# Patient Record
Sex: FEMALE | Race: WHITE | NOT HISPANIC OR LATINO | Employment: FULL TIME | URBAN - METROPOLITAN AREA
[De-identification: names, ages, dates, MRNs, and addresses within clinical notes are randomized per-mention and may not be internally consistent; named-entity substitution may affect disease eponyms.]

---

## 2017-10-31 ENCOUNTER — TRANSFERRED RECORDS (OUTPATIENT)
Dept: MULTI SPECIALTY CLINIC | Facility: CLINIC | Age: 52
End: 2017-10-31

## 2019-03-18 ENCOUNTER — TRANSFERRED RECORDS (OUTPATIENT)
Dept: MULTI SPECIALTY CLINIC | Facility: CLINIC | Age: 54
End: 2019-03-18

## 2020-03-24 ENCOUNTER — TRANSFERRED RECORDS (OUTPATIENT)
Dept: HEALTH INFORMATION MANAGEMENT | Facility: CLINIC | Age: 55
End: 2020-03-24

## 2020-10-29 ENCOUNTER — VIRTUAL VISIT (OUTPATIENT)
Dept: URGENT CARE | Facility: CLINIC | Age: 55
End: 2020-10-29
Payer: COMMERCIAL

## 2020-10-29 DIAGNOSIS — Z20.828 EXPOSURE TO SARS-ASSOCIATED CORONAVIRUS: Primary | ICD-10-CM

## 2020-10-29 PROCEDURE — 99203 OFFICE O/P NEW LOW 30 MIN: CPT | Mod: TEL | Performed by: NURSE PRACTITIONER

## 2020-11-12 ENCOUNTER — APPOINTMENT (OUTPATIENT)
Dept: OCCUPATIONAL MEDICINE | Facility: CLINIC | Age: 55
End: 2020-11-12

## 2020-11-12 PROCEDURE — 99000 SPECIMEN HANDLING OFFICE-LAB: CPT | Performed by: FAMILY MEDICINE

## 2021-03-16 NOTE — PROGRESS NOTES
Assessment & Plan     Multiple joint pain  Patient with multiple complaints of arthritic pain in knees hands shoulders and neck.  Exam is unremarkable today.  She has never been tested for rheumatoid arthritis will obtain testing today.  We will treat the pain conservatively with Voltaren gel, Tylenol, and also's Celebrex.  - Rheumatoid factor  - Cyclic Citrullinated Peptide Antibody IgG  - diclofenac (VOLTAREN) 1 % topical gel  Dispense: 350 g; Refill: 1    Bilateral carpal tunnel syndrome  History of bilateral carpal tunnel surgery in the past.  Do not have these records at this time.  Requested an authorization sent to obtain these today.    History of right knee surgery  Patient with a history of right knee injury at work.  She has had prior surgeries on the right knee.  Currently following with orthopedics for this complaint.  She was recently given Celebrex to utilize for the pain.    Outside records needed  We will have patient sign authorization to receive outside records from medical facilities in [place].  Once these are available will update chart.    Mat Yee DO  [facility]    Piter Nelson is a 56 year old who presents for the following health issues     HPI     Musculoskeletal problem/pain  Onset/Duration: ongoing years/  Description  Location: hips, elbows, ankles, knees, feet, hands   Joint Swelling: YES- in  Her hands. Hx of carpal tunnel surgery   Redness: no  Pain: YES- shoulders neck.   Warmth: no  Intensity:  Moderate.  Progression of Symptoms:  worsening  Accompanying signs and symptoms:   Fevers: no  Numbness/tingling/weakness: YES- tingling in the right toes sometimes   History  Trauma to the area: YES- work comp injury (fell at work) with the right knee. Surgery arthroscopic due to bone on bone - last year was her last surgery for a torn meniscus and arthritis.   Recent illness:  no  Previous similar problem: YES  Previous evaluation:  YES - in  "[place]. Used to be called [facility] in [place] - unable to find this facility online and phone number is no longer in service.   Precipitating or alleviating factors:  Aggravating factors include: sitting, standing, walking, climbing stairs, lifting, exercise, overuse and cold or damp weather  Therapies tried and outcome: tylenol for arthritis, Aleve for arthritis     Arthritis has been worsening over the last year.   Quite stiff in the morning, takes 30 seconds until she can stretch out.      Has history of bilateral carpal tunnel surgery.     Has been taking Tylenol and Aleve for arthritis     Does not believe she has ever been evaluated for rheumatoid arthritis.     Currently follow with Orthopedics for left knee pain and evaluation. She was prescribed Celebrex for pain and discomfort. Hasn't started taking the medication yet at this time.     Patient was receiving her cares from facilities in [place] prior to moving back to [place]. Unfortunately, do not have these records for today. Will have patient sign prior authorization to obtain these records to update medical chart.       Review of Systems   Constitutional, HEENT, cardiovascular, pulmonary, gi and gu systems are negative, except as otherwise noted.      Objective    /82 (BP Location: Left arm, Patient Position: Sitting, Cuff Size: Adult Large)   Pulse 64   Temp 98.8  F (37.1  C) (Tympanic)   Resp 14   Ht 1.651 m (5' 5\")   Wt 89.2 kg (196 lb 9.6 oz)   SpO2 96%   BMI 32.72 kg/m    Body mass index is 32.72 kg/m .  Physical Exam   General: alert, cooperative, no acute distress   CV: RRR, no murmur  Resp: non-labored breathing, clear to auscultation, no wheezing or rales   Abdomen: Soft, non-tender, no guarding.   Extremities: No peripheral edema, calves non-tender.   MSK: Joints without swelling or erythema. Good range of motion in the hands, wrists, neck, knees. Good strength in the hands bilaterally. Full range of motion in the neck.   "

## 2021-03-17 ENCOUNTER — OFFICE VISIT (OUTPATIENT)
Dept: FAMILY MEDICINE | Facility: CLINIC | Age: 56
End: 2021-03-17
Payer: COMMERCIAL

## 2021-03-17 VITALS
TEMPERATURE: 98.8 F | WEIGHT: 196.6 LBS | RESPIRATION RATE: 14 BRPM | HEART RATE: 64 BPM | BODY MASS INDEX: 32.76 KG/M2 | HEIGHT: 65 IN | OXYGEN SATURATION: 96 % | SYSTOLIC BLOOD PRESSURE: 114 MMHG | DIASTOLIC BLOOD PRESSURE: 82 MMHG

## 2021-03-17 DIAGNOSIS — Z98.890 HISTORY OF RIGHT KNEE SURGERY: ICD-10-CM

## 2021-03-17 DIAGNOSIS — G56.03 BILATERAL CARPAL TUNNEL SYNDROME: ICD-10-CM

## 2021-03-17 DIAGNOSIS — M25.50 MULTIPLE JOINT PAIN: Primary | ICD-10-CM

## 2021-03-17 PROBLEM — K58.1 IRRITABLE BOWEL SYNDROME WITH CONSTIPATION: Status: ACTIVE | Noted: 2018-02-01

## 2021-03-17 PROCEDURE — 86431 RHEUMATOID FACTOR QUANT: CPT | Performed by: FAMILY MEDICINE

## 2021-03-17 PROCEDURE — 86200 CCP ANTIBODY: CPT | Performed by: FAMILY MEDICINE

## 2021-03-17 PROCEDURE — 99213 OFFICE O/P EST LOW 20 MIN: CPT | Performed by: FAMILY MEDICINE

## 2021-03-17 PROCEDURE — 36415 COLL VENOUS BLD VENIPUNCTURE: CPT | Performed by: FAMILY MEDICINE

## 2021-03-17 RX ORDER — ACETAMINOPHEN 500 MG
500-1000 TABLET ORAL EVERY 6 HOURS PRN
COMMUNITY

## 2021-03-17 RX ORDER — CELECOXIB 200 MG/1
200 CAPSULE ORAL DAILY PRN
COMMUNITY
Start: 2020-08-01 | End: 2021-11-23

## 2021-03-17 ASSESSMENT — ANXIETY QUESTIONNAIRES
2. NOT BEING ABLE TO STOP OR CONTROL WORRYING: NOT AT ALL
IF YOU CHECKED OFF ANY PROBLEMS ON THIS QUESTIONNAIRE, HOW DIFFICULT HAVE THESE PROBLEMS MADE IT FOR YOU TO DO YOUR WORK, TAKE CARE OF THINGS AT HOME, OR GET ALONG WITH OTHER PEOPLE: SOMEWHAT DIFFICULT
6. BECOMING EASILY ANNOYED OR IRRITABLE: SEVERAL DAYS
1. FEELING NERVOUS, ANXIOUS, OR ON EDGE: SEVERAL DAYS
7. FEELING AFRAID AS IF SOMETHING AWFUL MIGHT HAPPEN: SEVERAL DAYS
5. BEING SO RESTLESS THAT IT IS HARD TO SIT STILL: NOT AT ALL
3. WORRYING TOO MUCH ABOUT DIFFERENT THINGS: SEVERAL DAYS
GAD7 TOTAL SCORE: 4

## 2021-03-17 ASSESSMENT — MIFFLIN-ST. JEOR: SCORE: 1492.65

## 2021-03-17 ASSESSMENT — PATIENT HEALTH QUESTIONNAIRE - PHQ9
5. POOR APPETITE OR OVEREATING: NOT AT ALL
SUM OF ALL RESPONSES TO PHQ QUESTIONS 1-9: 9

## 2021-03-17 NOTE — PATIENT INSTRUCTIONS
Check for rheumatoid arthritis today with lab draw    Can use Celebrex for arthritis pain, can also use Tylenol.     Trial of Voltaren gel as well for pain     Once obtain outside records will update chart.

## 2021-03-18 LAB
CCP AB SER IA-ACNC: 1 U/ML
RHEUMATOID FACT SER NEPH-ACNC: <7 IU/ML (ref 0–20)

## 2021-03-18 ASSESSMENT — ANXIETY QUESTIONNAIRES: GAD7 TOTAL SCORE: 4

## 2021-04-03 ENCOUNTER — HEALTH MAINTENANCE LETTER (OUTPATIENT)
Age: 56
End: 2021-04-03

## 2021-05-10 ENCOUNTER — TELEPHONE (OUTPATIENT)
Dept: FAMILY MEDICINE | Facility: CLINIC | Age: 56
End: 2021-05-10

## 2021-05-10 NOTE — TELEPHONE ENCOUNTER
Patient Quality Outreach      Summary:    Patient has the following on her problem list/HM:   Depression / Dysthymia review    6 Month Remission: 4-8 month window range: no  12 Month Remission: 10-14 month window range: no       PHQ-9 SCORE 3/17/2021   PHQ-9 Total Score 9       If PHQ-9 recheck is 5 or more, route to provider for next steps.    Patient is due/failing the following:   FIT, Colonoscopy and Cologuard, Breast Cancer Screening - Mammogram and Cervical Cancer Screening - PAP Needed    Type of outreach:    Sent [employer] message.    Questions for provider review:    None                                                                                                                                     Autumn Mccartney MA

## 2021-05-11 ENCOUNTER — MYC MEDICAL ADVICE (OUTPATIENT)
Dept: FAMILY MEDICINE | Facility: CLINIC | Age: 56
End: 2021-05-11
Payer: COMMERCIAL

## 2021-05-18 ENCOUNTER — ANCILLARY PROCEDURE (OUTPATIENT)
Dept: GENERAL RADIOLOGY | Facility: CLINIC | Age: 56
End: 2021-05-18
Attending: FAMILY MEDICINE
Payer: COMMERCIAL

## 2021-05-18 ENCOUNTER — OFFICE VISIT (OUTPATIENT)
Dept: FAMILY MEDICINE | Facility: CLINIC | Age: 56
End: 2021-05-18
Payer: COMMERCIAL

## 2021-05-18 VITALS
HEIGHT: 65 IN | TEMPERATURE: 97.9 F | HEART RATE: 65 BPM | SYSTOLIC BLOOD PRESSURE: 124 MMHG | WEIGHT: 197 LBS | DIASTOLIC BLOOD PRESSURE: 70 MMHG | BODY MASS INDEX: 32.82 KG/M2 | RESPIRATION RATE: 16 BRPM | OXYGEN SATURATION: 98 %

## 2021-05-18 DIAGNOSIS — M25.511 ACUTE PAIN OF RIGHT SHOULDER: ICD-10-CM

## 2021-05-18 DIAGNOSIS — M75.41 IMPINGEMENT SYNDROME, SHOULDER, RIGHT: ICD-10-CM

## 2021-05-18 DIAGNOSIS — M75.41 IMPINGEMENT SYNDROME, SHOULDER, RIGHT: Primary | ICD-10-CM

## 2021-05-18 DIAGNOSIS — M79.671 RIGHT FOOT PAIN: ICD-10-CM

## 2021-05-18 PROCEDURE — 99214 OFFICE O/P EST MOD 30 MIN: CPT | Performed by: FAMILY MEDICINE

## 2021-05-18 PROCEDURE — 73030 X-RAY EXAM OF SHOULDER: CPT | Mod: RT | Performed by: RADIOLOGY

## 2021-05-18 RX ORDER — IBUPROFEN 800 MG/1
800 TABLET, FILM COATED ORAL EVERY 8 HOURS PRN
COMMUNITY
End: 2021-05-18

## 2021-05-18 ASSESSMENT — MIFFLIN-ST. JEOR: SCORE: 1494.47

## 2021-05-18 ASSESSMENT — PAIN SCALES - GENERAL: PAINLEVEL: MILD PAIN (3)

## 2021-05-18 NOTE — PROGRESS NOTES
Assessment & Plan     Impingement syndrome, shoulder, right  Acute pain of right shoulder  Discussed symptoms of right shoulder likely related to impingement syndrome.  Discussed conservative cares.  Will obtain x-ray for further evaluation and also will place a physical therapy referral.  If symptoms persist or do not improve with physical therapy will consider a steroid injection or referral to sports medicine.  Patient agrees with the plan.  All questions answered.  - XR Shoulder Right 2 Views  - PHYSICAL THERAPY REFERRAL    Right foot pain  No new trauma, and no signs of infection or concern at this time.  Discussed conservative cares with wearing wide base shoes, Tylenol, NSAIDs, ice, compression.  If symptoms persist or if they worsen discussed return to clinic for further evaluation and cares.    Follow up as needed.     Mat Yee DO  [facility] 56 year [facility] who presents for the following health issues     HPI   56 year old female presents to clinic to discuss msk concerns.     Right shoulder pain  Onset 2 weeks ago.   Strong achy pain that is constant. On the front aspect.    Improved with lying on that shoulder  Has tried ice, and lidocaine patches with mild improvement  Pain worsened by movement and with trying to get clothes on and off at night.   Has not been red or hot.   No prior surgeries on the shoulders.       Right foot bump  Started 1 week ago.   Hurts when wearing shoes.   Achy pain that is constant but worsens with wearing shoes. Depends on what she is doing as well. Walking on uneven ground makes it worse.   No new shoes since then.   No new activity.   No trauma to the foot.    No prior surgeries on the foot.  Has not tried any therapies yet at this time.       Review of Systems   Constitutional, HEENT, cardiovascular, pulmonary, gi and gu systems are negative, except as otherwise noted.      Objective    /70 (BP Location: Left  "arm, Patient Position: Chair, Cuff Size: Adult Large)   Pulse 65   Temp 97.9  F (36.6  C) (Tympanic)   Resp 16   Ht 1.651 m (5' 5\")   Wt 89.4 kg (197 lb)   LMP  (LMP Unknown)   SpO2 98%   BMI 32.78 kg/m    Body mass index is 32.78 kg/m .  Physical Exam   General: alert, cooperative, no acute distress   MSK shoulder: No swelling, erythema, atrophy or deformity.  Tender palpation of the anterior shoulder otherwise nontender over the sternum, clavicle, AC joint, lateral posterior shoulder, spinous scapula, cervical spine.  Range of motion is full in flexion and abduction although slight pain at horizontal.  Full external and internal rotation.  No pain with resisted external rotation.  Strength 5 out of 5 in upper extremities bilaterally.  Sensation intact.  Distal pulse strong.  Empty can testing positive.  Positive Springer, Neer's.  AC crossover test negative.  CV: RRR, no murmur  Resp: non-labored breathing, clear to auscultation, no wheezing or rales   Abdomen: Soft, non-tender, no guarding.   Extremities: No peripheral edema, calves non-tender.   Right Foot: No swelling, erythema or deformity.  Mild tenderness over the lateral aspect of the foot.  No erythematous streaking up or down the foot.  No skin breakage.  Good range of motion in plantarflexion, dorsiflexion, inversion and eversion.      "

## 2021-05-19 NOTE — RESULT ENCOUNTER NOTE
No fracture seen on x-ray.  No bone spurs appreciated either.  Continue with current plan to proceed with physical therapy.

## 2021-05-20 ENCOUNTER — APPOINTMENT (OUTPATIENT)
Dept: OCCUPATIONAL MEDICINE | Facility: CLINIC | Age: 56
End: 2021-05-20

## 2021-05-20 PROCEDURE — 99000 SPECIMEN HANDLING OFFICE-LAB: CPT | Performed by: FAMILY MEDICINE

## 2021-05-27 ENCOUNTER — RECORDS - HEALTHEAST (OUTPATIENT)
Dept: ADMINISTRATIVE | Facility: CLINIC | Age: 56
End: 2021-05-27

## 2021-05-28 ENCOUNTER — RECORDS - HEALTHEAST (OUTPATIENT)
Dept: ADMINISTRATIVE | Facility: CLINIC | Age: 56
End: 2021-05-28

## 2021-06-07 ENCOUNTER — HOSPITAL ENCOUNTER (OUTPATIENT)
Dept: PHYSICAL THERAPY | Facility: CLINIC | Age: 56
Setting detail: THERAPIES SERIES
End: 2021-06-07
Attending: FAMILY MEDICINE
Payer: COMMERCIAL

## 2021-06-07 DIAGNOSIS — M25.511 ACUTE PAIN OF RIGHT SHOULDER: ICD-10-CM

## 2021-06-07 DIAGNOSIS — M75.41 IMPINGEMENT SYNDROME, SHOULDER, RIGHT: ICD-10-CM

## 2021-06-07 PROCEDURE — 97110 THERAPEUTIC EXERCISES: CPT | Mod: GP | Performed by: PHYSICAL THERAPIST

## 2021-06-07 PROCEDURE — 97162 PT EVAL MOD COMPLEX 30 MIN: CPT | Mod: GP | Performed by: PHYSICAL THERAPIST

## 2021-06-07 PROCEDURE — 97035 APP MDLTY 1+ULTRASOUND EA 15: CPT | Mod: GP | Performed by: PHYSICAL THERAPIST

## 2021-06-07 PROCEDURE — 97140 MANUAL THERAPY 1/> REGIONS: CPT | Mod: GP | Performed by: PHYSICAL THERAPIST

## 2021-06-07 NOTE — PROGRESS NOTES
06/07/21 1600   General Information   Type of Visit Initial OP Ortho PT Evaluation   Start of Care Date 06/07/21   Referring Physician Mat Yee    Patient/Family Goals Statement Better sleep - can't get comfortable, Pain w/ sitting watching TV. Lifting 0 - 90 is more difficult than above 90.    Orders Evaluate and Treat   Date of Order 05/18/21   Certification Required? No  (HP - Auth'd)   Medical Diagnosis Impingement Syndrome R Shoulder    Surgical/Medical history reviewed   (OA throughout body, Arthroscopy knee R 20. )   Precautions/Limitations no known precautions/limitations   Special Instructions Asked to try ice at home vs. heat.    General Information Comments Takes Celebrex for knee, and using some Voltaren cream for shoulder also that was prescribed for knee.    Body Part(s)   Body Part(s) Shoulder   Presentation and Etiology   Pertinent history of current problem (include personal factors and/or comorbidities that impact the POC) Started approximately 5/4/21, No trauma, Sudden onset. More R>L.    Impairments A. Pain;B. Decreased WB tolerance;F. Decreased strength and endurance;M. Locking or catching   Functional Limitations perform activities of daily living;perform required work activities   Symptom Location Post Laterall upper arm and Ant R Shoulder, Catching w/ movement and painful.    How/Where did it occur From insidious onset   Onset date of current episode/exacerbation 05/04/21   Chronicity New   Pain rating (0-10 point scale)   (2-7/10 )   Pain quality A. Sharp;B. Dull;C. Aching   Pain/symptoms are: Worse during the day  (Towards end of evening. )   Pain/symptoms exacerbated by C. Lifting;D. Carrying;G. Certain positions   Pain/symptoms eased by D. Nothing;E. Changing positions;G. Heat   Progression of symptoms since onset: Unchanged   Current / Previous Interventions   Diagnostic Tests: X-ray   X-ray Results Results   X-ray results Possible Dislocation.    Current Level of Function    Current Community Support Family/friend caregiver   Patient role/employment history A. 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    Living environment [place]   Fall Risk Screen   Fall screen completed by PT   Have you fallen 2 or more times in the past year? No   Have you fallen and had an injury in the past year? No   Timed Up and Go score (seconds) Fell on butt this winter    Is patient a fall risk? No   Abuse Screen (yes response referral indicated)   Feels Unsafe at Home or Work/School no   Feels Threatened by Someone no   Does Anyone Try to Keep You From Having Contact with Others or Doing Things Outside Your Home? no   Physical Signs of Abuse Present no   System Outcome Measures   Outcome Measures   (SPADI  32.31 )   Functional Scales   Functional Scales OPTIMAL   Optimal (1=able to do without difficulty, 2=able to do with little difficulty, 3=able to do with moderate difficulty, 4=able to do with much difficulty, 5=unable to do, 9=NA) Activity 2;Activity 1;Activity 3   Activity 1 comment Sleep disturbed d/t pain 5/10 w/laying on R side    Activity 2 comment Washing back 6/10 function per SPADI    Activity 3 comment Putting on pullover shirt 7/10 on functional scale per SPADI   Shoulder Objective Findings   Observation No acute distress.    Integumentary  No bruising, etc.    Posture Good    Cervical Screen (ROM, quadrant) Stiff w/ ROM, Post Quadrant B just stiff.    Shoulder ROM Comment Flex R 122 w/Pain, L 144; Abd R 147 w/pain, L 162;  ER R 59 w/ Pain end range, L 61; IR R T8, L T7.    Scapulothoracic Rhythm Winging on R w/Scap>Flex    Springer-Chase Test Positive per MD.    Crossover Test Positive per MD.    Shoulder Special Tests Comments MMT: E.Can, Elbow Flex,    Palpation R UTrap, R Coracoid process, R Supraspin tendon.    Planned Therapy Interventions   Planned Therapy Interventions Comment MT, STM, Develop HEP. PTRX# [id number]   Planned Modality Interventions    Planned Modality Interventions Comments US   Clinical Impression   Criteria for Skilled Therapeutic Interventions Met yes, treatment indicated   PT Diagnosis Possible SLAP, RC Tendonitis, Biceps Tendonitis, Scapular Dysfunction    Influenced by the following impairments Pain, Decreased ROM and strength.    Functional limitations due to impairments ADL's, HH tasks    Clinical Presentation Evolving/Changing   Clinical Presentation Rationale SPADI, ROM, MMT, Scap Winging, Hx of OA   Clinical Decision Making (Complexity) Moderate complexity   Therapy Frequency 2 times/Week   Predicted Duration of Therapy Intervention (days/wks) 5 weeks, then reassess, 10 visits.    Risk & Benefits of therapy have been explained Yes   Patient, Family & other staff in agreement with plan of care Yes   Clinical Impression Comments Possible SLAP, RC Tendonitis, Biceps Tendonitis, Scapular Dysfunction    Education Assessment   Preferred Learning Style Listening;Demonstration;Pictures/video   Barriers to Learning No barriers   ORTHO GOALS   PT Ortho Eval Goals 1;2;3;4   Ortho Goal 1   Goal Identifier 1   Goal Description STG: Pt will be able to sleep w/pain 2/10 or less.    Target Date 07/14/21   Ortho Goal 2   Goal Identifier 2   Goal Description STG: Pt will be able to wsh her back 3/10 on SPADI.    Target Date 07/14/21   Ortho Goal 3   Goal Identifier 3   Goal Description STG: Pt will be able to put on pullover shirt 3/10 per SPADI    Target Date 07/14/21   Ortho Goal 4   Goal Identifier 4   Goal Description LTG: Pt will be independent w/ HEP and self cares to manage shoulder pain.    Target Date 07/22/21   Total Evaluation Time   PT Ariel, Moderate Complexity Minutes (95700) 30   Diana Van PT, [facility] (#[id number])  [facility]           [phone]  Fax          [phone]  Appt #      [phone]

## 2021-06-10 ENCOUNTER — HOSPITAL ENCOUNTER (OUTPATIENT)
Dept: PHYSICAL THERAPY | Facility: CLINIC | Age: 56
Setting detail: THERAPIES SERIES
End: 2021-06-10
Attending: FAMILY MEDICINE
Payer: COMMERCIAL

## 2021-06-10 PROCEDURE — 97035 APP MDLTY 1+ULTRASOUND EA 15: CPT | Mod: GP | Performed by: PHYSICAL THERAPIST

## 2021-06-10 PROCEDURE — 97110 THERAPEUTIC EXERCISES: CPT | Mod: GP | Performed by: PHYSICAL THERAPIST

## 2021-06-16 ENCOUNTER — HOSPITAL ENCOUNTER (OUTPATIENT)
Dept: PHYSICAL THERAPY | Facility: CLINIC | Age: 56
Setting detail: THERAPIES SERIES
End: 2021-06-16
Attending: FAMILY MEDICINE
Payer: COMMERCIAL

## 2021-06-16 PROCEDURE — 97140 MANUAL THERAPY 1/> REGIONS: CPT | Mod: GP | Performed by: PHYSICAL THERAPIST

## 2021-06-16 PROCEDURE — 97110 THERAPEUTIC EXERCISES: CPT | Mod: GP | Performed by: PHYSICAL THERAPIST

## 2021-06-21 ENCOUNTER — HOSPITAL ENCOUNTER (OUTPATIENT)
Dept: PHYSICAL THERAPY | Facility: CLINIC | Age: 56
Setting detail: THERAPIES SERIES
End: 2021-06-21
Attending: FAMILY MEDICINE
Payer: COMMERCIAL

## 2021-06-21 PROCEDURE — 97140 MANUAL THERAPY 1/> REGIONS: CPT | Mod: GP | Performed by: PHYSICAL THERAPIST

## 2021-06-21 PROCEDURE — 97110 THERAPEUTIC EXERCISES: CPT | Mod: GP | Performed by: PHYSICAL THERAPIST

## 2021-06-24 ENCOUNTER — OFFICE VISIT (OUTPATIENT)
Dept: FAMILY MEDICINE | Facility: CLINIC | Age: 56
End: 2021-06-24
Payer: COMMERCIAL

## 2021-06-24 ENCOUNTER — HOSPITAL ENCOUNTER (OUTPATIENT)
Dept: PHYSICAL THERAPY | Facility: CLINIC | Age: 56
Setting detail: THERAPIES SERIES
End: 2021-06-24
Attending: FAMILY MEDICINE
Payer: COMMERCIAL

## 2021-06-24 VITALS
DIASTOLIC BLOOD PRESSURE: 70 MMHG | OXYGEN SATURATION: 98 % | TEMPERATURE: 97.5 F | RESPIRATION RATE: 16 BRPM | HEART RATE: 65 BPM | BODY MASS INDEX: 33.49 KG/M2 | WEIGHT: 201 LBS | SYSTOLIC BLOOD PRESSURE: 130 MMHG | HEIGHT: 65 IN

## 2021-06-24 DIAGNOSIS — M75.41 IMPINGEMENT SYNDROME OF SHOULDER REGION, RIGHT: Primary | ICD-10-CM

## 2021-06-24 PROCEDURE — 97140 MANUAL THERAPY 1/> REGIONS: CPT | Mod: GP | Performed by: PHYSICAL THERAPIST

## 2021-06-24 PROCEDURE — 20610 DRAIN/INJ JOINT/BURSA W/O US: CPT | Performed by: FAMILY MEDICINE

## 2021-06-24 RX ORDER — COVID-19 ANTIGEN TEST
660 KIT MISCELLANEOUS 3 TIMES DAILY PRN
COMMUNITY
End: 2021-11-23

## 2021-06-24 RX ORDER — IBUPROFEN 800 MG/1
800 TABLET, FILM COATED ORAL EVERY 8 HOURS PRN
COMMUNITY

## 2021-06-24 RX ORDER — LIDOCAINE HYDROCHLORIDE 10 MG/ML
3 INJECTION, SOLUTION EPIDURAL; INFILTRATION; INTRACAUDAL; PERINEURAL ONCE
Status: COMPLETED | OUTPATIENT
Start: 2021-06-24 | End: 2021-06-24

## 2021-06-24 RX ORDER — TRIAMCINOLONE ACETONIDE 40 MG/ML
40 INJECTION, SUSPENSION INTRA-ARTICULAR; INTRAMUSCULAR ONCE
Status: COMPLETED | OUTPATIENT
Start: 2021-06-24 | End: 2021-06-24

## 2021-06-24 RX ADMIN — TRIAMCINOLONE ACETONIDE 40 MG: 40 INJECTION, SUSPENSION INTRA-ARTICULAR; INTRAMUSCULAR at 14:07

## 2021-06-24 RX ADMIN — LIDOCAINE HYDROCHLORIDE 3 ML: 10 INJECTION, SOLUTION EPIDURAL; INFILTRATION; INTRACAUDAL; PERINEURAL at 14:07

## 2021-06-24 ASSESSMENT — PAIN SCALES - GENERAL: PAINLEVEL: MILD PAIN (3)

## 2021-06-24 ASSESSMENT — MIFFLIN-ST. JEOR: SCORE: 1512.61

## 2021-06-24 NOTE — PROGRESS NOTES
"    Assessment & Plan     Impingement syndrome of shoulder region, right  Continues to have signs of impingement syndrome of the right shoulder.  We will proceed with steroid injection and continue with physical therapy at this time.  Patient tolerated procedure well.  Postinjection instructions discussed.  All questions answered. If symptoms persist or not improve next steps would be to meet with sports medicine.   - triamcinolone (KENALOG-40) injection 40 mg  - lidocaine (PF) (XYLOCAINE) 1 % injection 3 mL    PROCEDURE: Right Subacromial bursa injection   Consent obtained  Time out performed  Site marked with otoscope cap  Betadine prep to posterior shoulder.   40mg/mL 1mL kenalgo mixed with 3mL 1% lidocaine injected into subacromial bursa utilizing a posterior approach   Hemostasis achieved bandaid applied.  Patient tolerated procedure well.  No complications. Post injection instructions discussed with patient.              BMI:   Estimated body mass index is 33.45 kg/m  as calculated from the following:    Height as of this encounter: 1.651 m (5' 5\").    Weight as of this encounter: 91.2 kg (201 lb).           No follow-ups on file.    Mat Yee, [facility]    Piter Nelson is a 56 year old who presents for the following health issues     HPI   56-year-old female who presents to clinic for follow-up of right shoulder pain.  She has been undergoing physical therapy with mild improvement of her symptoms. Presents to clinic today to discuss next steps.     She is interested in getting a cortisone shot today. Continues to have pain in the left shoulder. Worsened when lifting above the head and when reaching backwards. No new injury or trauma. No radicular symptoms. No recent fevers. Shoulder has not been red or hot.     Recent xray on 5/18/2021 was unremarkable.       Review of Systems   Constitutional, HEENT, cardiovascular, pulmonary, gi and gu systems are negative, except as " "otherwise noted.      Objective    /70 (BP Location: Right arm, Patient Position: Chair, Cuff Size: Adult Regular)   Pulse 65   Temp 97.5  F (36.4  C) (Tympanic)   Resp 16   Ht 1.651 m (5' 5\")   Wt 91.2 kg (201 lb)   LMP  (LMP Unknown)   SpO2 98%   BMI 33.45 kg/m    Body mass index is 33.45 kg/m .  Physical Exam   General: alert, cooperative, no acute distress  MSK Shoulder: No swelling, erythema, atrophy or deformity.  Tender to palpation in the anterior shoulder.  Range of motion full in flexion, abduction.  Pain at horizontal.  Range of motion full with internal/external rotation.  No pain with resisted external rotation.  Springer testing positive, Neer's testing positive.  Supraspinatus testing negative.  "

## 2021-06-24 NOTE — PROGRESS NOTES
PROGRESS NOTE FOR MD VISIT 06/24/21 1100   Signing Clinician's Name / Credentials   Signing clinician's name / credentials Diana Van, PT, MTC    Session Number   Session Number 5/10   Authorization status HP - Auth'd    Progress Note/Recertification   Progress Note Due Date 07/12/21   Adult Goals   PT Ortho Eval Goals 1;2;3;4   Ortho Goal 1   Goal Identifier 1   Goal Description STG: Pt will be able to sleep w/pain 2/10 or less.    Target Date 07/14/21   Ortho Goal 2   Goal Identifier 2   Goal Description STG: Pt will be able to wsh her back 3/10 on SPADI.    Target Date 07/14/21   Ortho Goal 3   Goal Identifier 3   Goal Description STG: Pt will be able to put on pullover shirt 3/10 per SPADI    Target Date 07/14/21   Ortho Goal 4   Goal Identifier 4   Goal Description LTG: Pt will be independent w/ HEP and self cares to manage shoulder pain.    Target Date 07/22/21   Subjective Report   Subjective Report Pain Scale: usually 2-7/10.  Took aleve this morning so not hurting right now. Shoulder catches and then it really hurts.  Pain still Ant shoulder and lateral arm. All ex's going well and no pain.    Objective Measures   Objective Measures Objective Measure 1;Objective Measure 2;Objective Measure 3;Objective Measure 4   Objective Measure 1   Objective Measure SPADI    Details 6/24/21  Score 27.69.  INITIALLY: 32.31    Objective Measure 2   Objective Measure AROM in standing    Details 6/24/21   Flex 137 w/ pain, Abd 137 w/ pain, ER 69 w/ pain, IR T9 w/ Pain. INITIALLY:  Flex 122 w/ pain, Abd 147 w/ pain, ER 59 w/pain, IR T8.    Objective Measure 3   Objective Measure MMT   Details 6/24/21  E.Can 4+ w/pain, Elbow Flex 5- w/ Pain. . INITIALlY:  E.Can and Elbow flex 4 w/pain.    Objective Measure 4   Objective Measure Palpation    Details R UTrap, Coracoid, Supraspin Tendon    Modalities   Modalities Ultrasound   Ultrasound   Patient Response Did not notice a difference w/ US    Treatment Interventions    Interventions Therapeutic Procedure/Exercise;Manual Therapy   Therapeutic Procedure/exercise   Therapeutic Procedures: strength, endurance, ROM, flexibillity minutes (19612) 10   Skilled Intervention ROM, HEP    Patient Response Demosntrated all ex's in dept, slight P w/Sh Retractin    Treatment Detail Red TBand for R.S. for IR/ER.  UBE, Seat 7, 2' at no resist. Cupboard slide for flex, Added Isometric Flex/Ext and Ab/Ad w/ Rhythmic staba. Scap Retraction, Pulleys for Flex, Codman's    Progress  PTRX# [id number]   Manual Therapy   Manual Therapy: Mobilization, MFR, MLD, friction massage minutes (97486) 10   Skilled Intervention Inflammation of tendons.    Patient Response Pain when trying ice massage to coracoid process area.    Treatment Detail STM to Pectorals w/ manual stretch, DTFM to Biceps tendon, Biceps mm STM w/ milking towards proximal tendon. Taught and did Ice massage w/ ice cup at home.    Progress Supraspinatus/Biceps tendon massage   Assessments Completed   Assessments Completed Shoulder    Equipment Needs   Equipment Needs Pulleys    Education   Learner Patient   Readiness Eager;Acceptance   Method Booklet/handout;Explanation;Demonstration   Response Verbalizes Understanding;Demonstrates Understanding   Education Comments PTRX# [id number]   Communication with other professionals   Communication with other professionals Asked for FSOC vs. MRI vs. Cortisone injection from MD.    Plan   Homework HP - Auth'd    Home program PTRX# [id number]   Plan for next session US, Progress Ex's, UBE,    Comments   Comments Possible SLAP, RC Tendonitis, Biceps Tendonitis, Scapular Dysfunction    Total Session Time   Timed Code Treatment Minutes 20   Total Treatment Time (sum of timed and untimed services) 20   AMBULATORY CLINICS ONLY-MEDICAL AND TREATMENT DIAGNOSIS   PT Diagnosis Possible SLAP, RC Tendonitis, Biceps Tendonitis, Scapular Dysfunction    Diana Van, PT, MTC (#[id number])  [facility]  [facility]           [phone]  Fax          [phone]  Appt #      [phone]

## 2021-06-24 NOTE — PATIENT INSTRUCTIONS
Continue with physical therapy.     Let me know if symptoms are not improving.     Patient Education     Cortisone Injections  Cortisone is a type of steroid. It can greatly reduce swelling, redness, inflammation, and pain. Being injected with cortisone is simple and doesn t take long. Your doctor may ask you questions about your health. Cortisone can affect certain health conditions, such as diabetes.     Why have a cortisone injection?  Injecting cortisone can sometimes relieve pain for anything from a sports injury to arthritis. Your doctor may suggest an injection if rest, splints, physical therapy, rehabilitation exercises, or oral medicine doesn t relieve your pain. Injecting cortisone is simpler than having surgery. And cortisone may provide the lasting pain relief that can help you get out and enjoy life again. Be sure to discuss all options with your healthcare provider. Injections are usually used no more than 3 to 4 times a year in one area of the body.   Getting the injection  Your doctor will start by cleaning and possibly numbing your skin at the injection site. Next you ll be injected with local anesthetics (for short-term pain relief) and cortisone. The injection may last a few moments. A small bandage will be put over the injection site. You ll then be ready to go home.   After your injection  After being injected, make sure you don t injure the treated region. But stay active. Enjoy a walk or some other mild activity. Just be careful not to strain the region that gave you trouble.   The next day  Some people feel more pain after being injected. This is normal, and it will go away soon. Applying ice for 20 minutes at a time to your injury may reduce the increased pain. Rest for the first day or two. You don t need to stay in bed. But avoid tasks that may strain the injured region.   Cortisone injections and diabetes  Cortisone injections can increase your blood sugar for several days after the  injection. If you have diabetes, watch your blood sugar closely during this time. Follow your regular plan for what to do when your blood sugar is elevated.   Shobha last reviewed this educational content on 6/1/2020 2000-2021 [employer]. All rights reserved. This information is not intended as a substitute for professional medical care. Always follow your healthcare professional's instructions.

## 2021-06-24 NOTE — PROGRESS NOTES
PROGRESS NOTE FOR MD VISIT 06/24/21 1100   Signing Clinician's Name / Credentials   Signing clinician's name / credentials Diana Van, PT, MTC    Session Number   Session Number 5/10   Authorization status HP - Auth'd    Progress Note/Recertification   Progress Note Due Date 07/12/21   Adult Goals   PT Ortho Eval Goals 1;2;3;4   Ortho Goal 1   Goal Identifier 1   Goal Description STG: Pt will be able to sleep w/pain 2/10 or less.    Target Date 07/14/21   Ortho Goal 2   Goal Identifier 2   Goal Description STG: Pt will be able to wsh her back 3/10 on SPADI.    Target Date 07/14/21   Ortho Goal 3   Goal Identifier 3   Goal Description STG: Pt will be able to put on pullover shirt 3/10 per SPADI    Target Date 07/14/21   Ortho Goal 4   Goal Identifier 4   Goal Description LTG: Pt will be independent w/ HEP and self cares to manage shoulder pain.    Target Date 07/22/21   Subjective Report   Subjective Report Pain Scale: usually 2-7/10.  Took aleve this morning so not hurting right now. Shoulder catches and then it really hurts.  Pain still Ant shoulder and lateral arm. All ex's going well and no pain.    Objective Measures   Objective Measures Objective Measure 1;Objective Measure 2;Objective Measure 3;Objective Measure 4   Objective Measure 1   Objective Measure SPADI    Details 6/24/21  Score 27.69.  INITIALLY: 32.31    Objective Measure 2   Objective Measure AROM in standing    Details INITIALLY:  Flex 137 w/ pain, Abd 137 w/ pain, ER 69 w/ pain, IR T9 w/ Pain.  Flex 122 w/ pain, Abd 147 w/ pain, ER 59 w/pain, IR T8.    Objective Measure 3   Objective Measure MMT   Details 6/24/21  E.Can 4+ w/pain, Elbow Flex 5- w/ Pain. . INITIALlY:  E.Can and Elbow flex 4 w/pain.    Objective Measure 4   Objective Measure Palpation    Details R UTrap, Coracoid, Supraspin Tendon    Modalities   Modalities Ultrasound   Ultrasound   Patient Response Did not notice a difference w/ US    Treatment Interventions   Interventions  Therapeutic Procedure/Exercise;Manual Therapy   Therapeutic Procedure/exercise   Therapeutic Procedures: strength, endurance, ROM, flexibillity minutes (86130) 10   Skilled Intervention ROM, HEP    Patient Response Demosntrated all ex's in dept, slight P w/Sh Retractin    Treatment Detail Red TBand for R.S. for IR/ER.  UBE, Seat 7, 2' at no resist. Cupboard slide for flex, Added Isometric Flex/Ext and Ab/Ad w/ Rhythmic staba. Scap Retraction, Pulleys for Flex, Codman's    Progress  PTRX# [email or web address]   Manual Therapy   Manual Therapy: Mobilization, MFR, MLD, friction massage minutes (71907) 10   Skilled Intervention Inflammation of tendons.    Patient Response Pain when trying ice massage to coracoid process area.    Treatment Detail STM to Pectorals w/ manual stretch, DTFM to Biceps tendon, Biceps mm STM w/ milking towards proximal tendon. Taught and did Ice massage w/ ice cup at home.    Progress Supraspinatus/Biceps tendon massage   Assessments Completed   Assessments Completed Shoulder    Equipment Needs   Equipment Needs Pulleys    Education   Learner Patient   Readiness Eager;Acceptance   Method Booklet/handout;Explanation;Demonstration   Response Verbalizes Understanding;Demonstrates Understanding   Education Comments PTRX# [email or web address]   Communication with other professionals   Communication with other professionals Asked for FSOC vs. MRI vs. Cortisone injection from MD.    Plan   Homework HP - Auth'd    Home program PTRX# [email or web address]   Plan for next session US, Progress Ex's, UBE,    Comments   Comments Possible SLAP, RC Tendonitis, Biceps Tendonitis, Scapular Dysfunction    Total Session Time   Timed Code Treatment Minutes 20   Total Treatment Time (sum of timed and untimed services) 20   AMBULATORY CLINICS ONLY-MEDICAL AND TREATMENT DIAGNOSIS   PT Diagnosis Possible SLAP, RC Tendonitis, Biceps Tendonitis, Scapular Dysfunction    Diana Van, PT, MTC (#[id number])  [facility]            [phone]  Fax          [phone]  Appt #      [phone]

## 2021-08-03 ENCOUNTER — TELEPHONE (OUTPATIENT)
Dept: FAMILY MEDICINE | Facility: CLINIC | Age: 56
End: 2021-08-03

## 2021-09-12 ENCOUNTER — HEALTH MAINTENANCE LETTER (OUTPATIENT)
Age: 56
End: 2021-09-12

## 2021-09-22 ENCOUNTER — HOSPITAL ENCOUNTER (EMERGENCY)
Facility: CLINIC | Age: 56
Discharge: HOME | End: 2021-09-22
Attending: FAMILY MEDICINE | Admitting: FAMILY MEDICINE
Payer: COMMERCIAL

## 2021-09-22 ENCOUNTER — APPOINTMENT (OUTPATIENT)
Dept: GENERAL RADIOLOGY | Facility: CLINIC | Age: 56
End: 2021-09-22
Attending: FAMILY MEDICINE
Payer: COMMERCIAL

## 2021-09-22 VITALS
TEMPERATURE: 98 F | RESPIRATION RATE: 18 BRPM | BODY MASS INDEX: 31.5 KG/M2 | WEIGHT: 196 LBS | HEIGHT: 66 IN | SYSTOLIC BLOOD PRESSURE: 105 MMHG | OXYGEN SATURATION: 94 % | HEART RATE: 57 BPM | DIASTOLIC BLOOD PRESSURE: 52 MMHG

## 2021-09-22 DIAGNOSIS — U07.1 CLINICAL DIAGNOSIS OF COVID-19: ICD-10-CM

## 2021-09-22 LAB
ALBUMIN SERPL-MCNC: 3.4 G/DL (ref 3.4–5)
ALP SERPL-CCNC: 65 U/L (ref 40–150)
ALT SERPL W P-5'-P-CCNC: 31 U/L (ref 0–50)
ANION GAP SERPL CALCULATED.3IONS-SCNC: 6 MMOL/L (ref 3–14)
AST SERPL W P-5'-P-CCNC: 23 U/L (ref 0–45)
BASOPHILS # BLD AUTO: 0 10E3/UL (ref 0–0.2)
BASOPHILS NFR BLD AUTO: 0 %
BILIRUB SERPL-MCNC: 0.6 MG/DL (ref 0.2–1.3)
BUN SERPL-MCNC: 10 MG/DL (ref 7–30)
CALCIUM SERPL-MCNC: 8.2 MG/DL (ref 8.5–10.1)
CHLORIDE BLD-SCNC: 104 MMOL/L (ref 94–109)
CO2 SERPL-SCNC: 29 MMOL/L (ref 20–32)
CREAT SERPL-MCNC: 0.86 MG/DL (ref 0.52–1.04)
D DIMER PPP FEU-MCNC: 0.43 UG/ML FEU (ref 0–0.5)
EOSINOPHIL # BLD AUTO: 0 10E3/UL (ref 0–0.7)
EOSINOPHIL NFR BLD AUTO: 0 %
ERYTHROCYTE [DISTWIDTH] IN BLOOD BY AUTOMATED COUNT: 11.3 % (ref 10–15)
GFR SERPL CREATININE-BSD FRML MDRD: 77 ML/MIN/1.73M2
GLUCOSE BLD-MCNC: 90 MG/DL (ref 70–99)
HCT VFR BLD AUTO: 42.6 % (ref 35–47)
HGB BLD-MCNC: 14.9 G/DL (ref 11.7–15.7)
HOLD SPECIMEN: NORMAL
IMM GRANULOCYTES # BLD: 0 10E3/UL
IMM GRANULOCYTES NFR BLD: 1 %
LYMPHOCYTES # BLD AUTO: 1 10E3/UL (ref 0.8–5.3)
LYMPHOCYTES NFR BLD AUTO: 32 %
MCH RBC QN AUTO: 31.5 PG (ref 26.5–33)
MCHC RBC AUTO-ENTMCNC: 35 G/DL (ref 31.5–36.5)
MCV RBC AUTO: 90 FL (ref 78–100)
MONOCYTES # BLD AUTO: 0.2 10E3/UL (ref 0–1.3)
MONOCYTES NFR BLD AUTO: 5 %
NEUTROPHILS # BLD AUTO: 1.8 10E3/UL (ref 1.6–8.3)
NEUTROPHILS NFR BLD AUTO: 62 %
NRBC # BLD AUTO: 0 10E3/UL
NRBC BLD AUTO-RTO: 0 /100
PLATELET # BLD AUTO: 128 10E3/UL (ref 150–450)
POTASSIUM BLD-SCNC: 3.7 MMOL/L (ref 3.4–5.3)
PROT SERPL-MCNC: 7.1 G/DL (ref 6.8–8.8)
RBC # BLD AUTO: 4.73 10E6/UL (ref 3.8–5.2)
SODIUM SERPL-SCNC: 139 MMOL/L (ref 133–144)
TROPONIN I SERPL-MCNC: <0.015 UG/L (ref 0–0.04)
WBC # BLD AUTO: 3 10E3/UL (ref 4–11)

## 2021-09-22 PROCEDURE — 96360 HYDRATION IV INFUSION INIT: CPT | Performed by: FAMILY MEDICINE

## 2021-09-22 PROCEDURE — 85379 FIBRIN DEGRADATION QUANT: CPT | Performed by: FAMILY MEDICINE

## 2021-09-22 PROCEDURE — 93010 ELECTROCARDIOGRAM REPORT: CPT | Performed by: FAMILY MEDICINE

## 2021-09-22 PROCEDURE — 84484 ASSAY OF TROPONIN QUANT: CPT | Performed by: FAMILY MEDICINE

## 2021-09-22 PROCEDURE — 71045 X-RAY EXAM CHEST 1 VIEW: CPT

## 2021-09-22 PROCEDURE — 99285 EMERGENCY DEPT VISIT HI MDM: CPT | Mod: 25 | Performed by: FAMILY MEDICINE

## 2021-09-22 PROCEDURE — 250N000011 HC RX IP 250 OP 636: Performed by: FAMILY MEDICINE

## 2021-09-22 PROCEDURE — 96361 HYDRATE IV INFUSION ADD-ON: CPT | Performed by: FAMILY MEDICINE

## 2021-09-22 PROCEDURE — 85025 COMPLETE CBC W/AUTO DIFF WBC: CPT | Performed by: FAMILY MEDICINE

## 2021-09-22 PROCEDURE — 258N000003 HC RX IP 258 OP 636: Performed by: FAMILY MEDICINE

## 2021-09-22 PROCEDURE — 36415 COLL VENOUS BLD VENIPUNCTURE: CPT | Performed by: FAMILY MEDICINE

## 2021-09-22 PROCEDURE — 93005 ELECTROCARDIOGRAM TRACING: CPT | Performed by: FAMILY MEDICINE

## 2021-09-22 PROCEDURE — 80053 COMPREHEN METABOLIC PANEL: CPT | Performed by: FAMILY MEDICINE

## 2021-09-22 RX ORDER — ONDANSETRON 4 MG/1
4 TABLET, ORALLY DISINTEGRATING ORAL ONCE
Status: COMPLETED | OUTPATIENT
Start: 2021-09-22 | End: 2021-09-22

## 2021-09-22 RX ADMIN — ONDANSETRON 4 MG: 4 TABLET, ORALLY DISINTEGRATING ORAL at 11:26

## 2021-09-22 RX ADMIN — SODIUM CHLORIDE, POTASSIUM CHLORIDE, SODIUM LACTATE AND CALCIUM CHLORIDE 1000 ML: 600; 310; 30; 20 INJECTION, SOLUTION INTRAVENOUS at 11:26

## 2021-09-22 ASSESSMENT — ENCOUNTER SYMPTOMS
WHEEZING: 0
VOMITING: 1
ABDOMINAL PAIN: 0
CHILLS: 1
SORE THROAT: 0
FEVER: 1
CONSTIPATION: 0
SINUS PRESSURE: 0
DIAPHORESIS: 0
BLOOD IN STOOL: 0
NAUSEA: 1
DIARRHEA: 1
PALPITATIONS: 0
FREQUENCY: 0
COUGH: 0
SHORTNESS OF BREATH: 1
DYSURIA: 0
HEADACHES: 1

## 2021-09-22 ASSESSMENT — MIFFLIN-ST. JEOR: SCORE: 1505.8

## 2021-09-22 NOTE — ED NOTES
Reviewed Covid Loop program with patient. Patient stated that she was disappointed that we did not make her feel better. Stated that she was hoping to get IV fluid which was discontinued by Dr Duque.

## 2021-09-22 NOTE — DISCHARGE INSTRUCTIONS
ICD-10-CM    1. Clinical diagnosis of COVID-19  U07.1     maintain hydration. 64 oz fluid per day. avoid lying on your back - stay upright position/or on side or stomach.  maintain deep breathing.

## 2021-09-22 NOTE — ED PROVIDER NOTES
History     Chief Complaint   Patient presents with     Suspected Covid     tested positive for covid Friday, non vaccinated.   decreased appetite, fevers, nauseated, vomiting started yesterday     HPI  Leslie Turner is a 56 year old female who presents with history of major depression esophageal reflux irritable bowel syndrome.  She is not vaccinated against Covid.  She tested positive for Covid last Friday after having had approximately 1 day of symptoms.  She is now out approximately 7 days and has been having for this week cough sense of dyspnea chest tightness.  She feels generally weak tired loose stools.  She has loss of taste and smell.  She has off-and-on fever.  Nausea and vomiting.  Decreased p.o. intake.  Darker urine.  No dysuria urgency frequency hematuria.  Also with low back pain.    Allergies:  Allergies   Allergen Reactions     Codeine Nausea and Vomiting     Dizziness, and fever       Problem List:    Patient Active Problem List    Diagnosis Date Noted     Irritable bowel syndrome with constipation 02/01/2018     Priority: Medium     Depressive disorder, not elsewhere classified 05/11/2007     Priority: Medium     Esophageal reflux 05/11/2007     Priority: Medium     CTS (carpal tunnel syndrome) 06/26/2004     Priority: Medium     History of right knee surgery 01/21/2003     Priority: Medium        Past Medical History:    Past Medical History:   Diagnosis Date     Arthritis      Backache, unspecified      Depressive disorder      Depressive disorder, not elsewhere classified      Dysmenorrhea      Esophageal reflux      Irritable bowel syndrome        Past Surgical History:    Past Surgical History:   Procedure Laterality Date     COLONOSCOPY  2018     HERNIA REPAIR  1967     SURGICAL HISTORY OF -       HEMORRHOIDECTOMY     SURGICAL HISTORY OF -       ANAL FISTULA, SPHINCTEROTOMY     SURGICAL HISTORY OF -   2005    CARPAL TUNNEL RELEASE     SURGICAL HISTORY OF -       HERNIA     SURGICAL  "HISTORY OF -   1995    BTL     SURGICAL HISTORY OF -       hyst       Family History:    Family History   Problem Relation Age of Onset     Diabetes Mother      Arthritis Mother      Kidney Disease Mother      Cancer Maternal Grandmother      Alzheimer Disease Maternal Grandmother        Social History:  Marital Status:   [2]  Social History     Tobacco Use     Smoking status: Never Smoker     Smokeless tobacco: Never Used     Tobacco comment: Patient quit in 1995,  smokes in car and outside   Substance Use Topics     Alcohol use: Not Currently     Drug use: No        Medications:    acetaminophen (TYLENOL) 500 MG tablet  celecoxib (CELEBREX) 200 MG capsule  diclofenac (VOLTAREN) 1 % topical gel  FLAGYL 500 MG OR TABS  ibuprofen (ADVIL/MOTRIN) 800 MG tablet  naproxen sodium 220 MG capsule  PROTONIX 40 MG OR TBEC          Review of Systems   Constitutional: Positive for chills and fever. Negative for diaphoresis.   HENT: Negative for ear pain, sinus pressure and sore throat.    Eyes: Negative for visual disturbance.   Respiratory: Positive for shortness of breath. Negative for cough and wheezing.    Cardiovascular: Positive for chest pain. Negative for palpitations.   Gastrointestinal: Positive for diarrhea, nausea and vomiting. Negative for abdominal pain, blood in stool and constipation.   Genitourinary: Negative for dysuria, frequency and urgency.   Skin: Negative for rash.   Neurological: Positive for headaches.   All other systems reviewed and are negative.      Physical Exam   BP: 125/86  Pulse: 80  Temp: 98  F (36.7  C)  Resp: 18  Height: 167.6 cm (5' 6\")  Weight: 88.9 kg (196 lb)  SpO2: 96 %      Physical Exam  Constitutional:       General: She is in acute distress.      Appearance: She is not diaphoretic.   HENT:      Head: Atraumatic.   Eyes:      Conjunctiva/sclera: Conjunctivae normal.   Cardiovascular:      Rate and Rhythm: Normal rate and regular rhythm.      Heart sounds: No murmur " heard.     Pulmonary:      Effort: Pulmonary effort is normal. No respiratory distress.      Breath sounds: Normal breath sounds. No stridor. No wheezing or rhonchi.   Abdominal:      General: Abdomen is flat. There is no distension.      Palpations: There is no mass.      Tenderness: There is no abdominal tenderness. There is no guarding.   Musculoskeletal:      Cervical back: Neck supple.      Right lower leg: No edema.      Left lower leg: No edema.   Skin:     Coloration: Skin is not pale.      Findings: No rash.   Neurological:      General: No focal deficit present.      Mental Status: She is alert.      Motor: No weakness.         ED Course        Procedures                EKG Interpretation:      Interpreted by Philipp Duque MD  KG done at 1208 hrs. demonstrates a sinus rhythm 57 bpm normal axis.  There is no ST change.  No to wave changes.  There is a normal R progression.  No Q waves.  Normal intervals.  Normal conduction.  No ectopy.  Impression sinus rhythm 57 bpm no acute change.      Critical Care time:  none               Results for orders placed or performed during the hospital encounter of 09/22/21 (from the past 24 hour(s))   Ward Draw    Narrative    The following orders were created for panel order Ward Draw.  Procedure                               Abnormality         Status                     ---------                               -----------         ------                     Extra Blue Top Tube[[id number]]                              Final result               Extra Red Top Tube[[id number]]                               Final result               Extra Green Top (Lithium...[[id number]]                      Final result               Extra Green Top (Lithium...[[id number]]                      Final result               Extra Purple Top Tube[[id number]]                            Final result               Extra Blood Bank Purple ...[[id number]]                                                    Please view results for these tests on the individual orders.   Extra Blue Top Tube   Result Value Ref Range    Hold Specimen JIC    Extra Red Top Tube   Result Value Ref Range    Hold Specimen JIC    Extra Green Top (Lithium Heparin) Tube   Result Value Ref Range    Hold Specimen JIC    Extra Green Top (Lithium Heparin) Tube   Result Value Ref Range    Hold Specimen JIC    Extra Purple Top Tube   Result Value Ref Range    Hold Specimen JIC    CBC with platelets differential    Narrative    The following orders were created for panel order CBC with platelets differential.  Procedure                               Abnormality         Status                     ---------                               -----------         ------                     CBC with platelets and d...[[id number]]  Abnormal            Final result                 Please view results for these tests on the individual orders.   Comprehensive metabolic panel   Result Value Ref Range    Sodium 139 133 - 144 mmol/L    Potassium 3.7 3.4 - 5.3 mmol/L    Chloride 104 94 - 109 mmol/L    Carbon Dioxide (CO2) 29 20 - 32 mmol/L    Anion Gap 6 3 - 14 mmol/L    Urea Nitrogen 10 7 - 30 mg/dL    Creatinine 0.86 0.52 - 1.04 mg/dL    Calcium 8.2 (L) 8.5 - 10.1 mg/dL    Glucose 90 70 - 99 mg/dL    Alkaline Phosphatase 65 40 - 150 U/L    AST 23 0 - 45 U/L    ALT 31 0 - 50 U/L    Protein Total 7.1 6.8 - 8.8 g/dL    Albumin 3.4 3.4 - 5.0 g/dL    Bilirubin Total 0.6 0.2 - 1.3 mg/dL    GFR Estimate 77 >60 mL/min/1.73m2   D dimer quantitative   Result Value Ref Range    D-Dimer Quantitative 0.43 0.00 - 0.50 ug/mL FEU    Narrative    This D-dimer assay is intended for use in conjunction with a clinical pretest probability assessment model to exclude pulmonary embolism (PE) and deep venous thrombosis (DVT) in outpatients suspected of PE or DVT. The cut-off value is 0.50 ug/mL FEU.   CBC with platelets and differential   Result Value Ref Range    WBC Count 3.0 (L) 4.0  - 11.0 10e3/uL    RBC Count 4.73 3.80 - 5.20 10e6/uL    Hemoglobin 14.9 11.7 - 15.7 g/dL    Hematocrit 42.6 35.0 - 47.0 %    MCV 90 78 - 100 fL    MCH 31.5 26.5 - 33.0 pg    MCHC 35.0 31.5 - 36.5 g/dL    RDW 11.3 10.0 - 15.0 %    Platelet Count 128 (L) 150 - 450 10e3/uL    % Neutrophils 62 %    % Lymphocytes 32 %    % Monocytes 5 %    % Eosinophils 0 %    % Basophils 0 %    % Immature Granulocytes 1 %    NRBCs per 100 WBC 0 <1 /100    Absolute Neutrophils 1.8 1.6 - 8.3 10e3/uL    Absolute Lymphocytes 1.0 0.8 - 5.3 10e3/uL    Absolute Monocytes 0.2 0.0 - 1.3 10e3/uL    Absolute Eosinophils 0.0 0.0 - 0.7 10e3/uL    Absolute Basophils 0.0 0.0 - 0.2 10e3/uL    Absolute Immature Granulocytes 0.0 <=0.0 10e3/uL    Absolute NRBCs 0.0 10e3/uL   CBC with platelets differential *Canceled*    Narrative    The following orders were created for panel order CBC with platelets differential.  Procedure                               Abnormality         Status                     ---------                               -----------         ------                       Please view results for these tests on the individual orders.   XR Chest Port 1 View    Narrative    XR CHEST PORT 1 VIEW 9/22/2021 11:54 AM    HISTORY: Positive coping testis. Fevers.    COMPARISON: May 8, 2007    FINDINGS: The heart and pulmonary vasculature appear within normal  limits. The lungs are clear without focal infiltrates. No pleural  effusions.      Impression    IMPRESSION: Unremarkable chest x-ray.    XAVI WINTER MD         SYSTEM ID:  [id number]       Medications   lactated ringers BOLUS 1,000 mL (1,000 mLs Intravenous New Bag 9/22/21 1126)   ondansetron (ZOFRAN-ODT) ODT tab 4 mg (4 mg Oral Given 9/22/21 1126)       Assessments & Plan (with Medical Decision Making)     MDM: Leslie Turner is a 56 year old female presents day 6-7 with Covid after exposure to her  who is vaccinated.  She has had loss of taste and smell, diarrhea, cough,  shortness of breath, chest tightness.  No significant underlying conditions.  Covid test positive on Friday.  Unlikely benefit from Regeneron does not meet criteria for this.  Will check related labs confirm no D-dimer troponin EKG chest x-ray abnormalities.  Plan Home on oral fluids.  Discussed not giving any IV fluids given her Covid.    Findings are reassuring.  We discussed overall management.  She is not in a high risk group to qualify for Regeneron and at this point at nearly 7 days of symptoms it is unlikely to offer any benefit.  I told her this.      I have given her precautions for return.  Oxygen saturation monitoring.  Discussed expected course and indications for return.    I have reviewed the nursing notes.    I have reviewed the findings, diagnosis, plan and need for follow up with the patient.       New Prescriptions    No medications on file       Final diagnoses:   Clinical diagnosis of COVID-19 - maintain hydration. 64 oz fluid per day. avoid lying on your back - stay upright position/or on side or stomach.  maintain deep breathing.       9/22/2021   [facility] EMERGENCY DEPT     Philipp Duque MD  09/22/21 9039

## 2021-11-10 ENCOUNTER — TELEPHONE (OUTPATIENT)
Dept: FAMILY MEDICINE | Facility: CLINIC | Age: 56
End: 2021-11-10
Payer: COMMERCIAL

## 2021-11-10 DIAGNOSIS — Z12.31 VISIT FOR SCREENING MAMMOGRAM: ICD-10-CM

## 2021-11-10 DIAGNOSIS — N64.4 BREAST PAIN: Primary | ICD-10-CM

## 2021-11-10 NOTE — TELEPHONE ENCOUNTER
Reason for Call:  Other     Detailed comments: Patient calling stating she went to have a mammogram today and was told to get a diagnostic mammogram order request because she was having pain her breast. Patient wants to get notified when order is placed so she can schedule mammogram asap.    Phone Number Patient can be reached at: Home number on file [phone] (home)    Best Time: any    Can we leave a detailed message on this number? YES    Call taken on 11/10/2021 at 10:34 AM by Sherie Colindres

## 2021-11-10 NOTE — PROGRESS NOTES
Outpatient Physical Therapy Discharge Note     Patient: Leslie Turner  : 1965    Beginning/End Dates of Reporting Period:  21 to 21.  Total # of Rx sessions: 5    Referring Provider: Mat Yee Diagnosis: Impingement Syndrome R Shoulder      Client Self Report: Sees MD this afternoon. Pain Scale: usually 2-7/10.  Took aleve this morning so not hurting right now. Shoulder catches and then it really hurts.  Pain still Ant shoulder and lateral arm. All ex's going well and no pain.     Objective Measurements:  Objective Measure: SPADI   Details: 21  Score 27.69.  INITIALLY: 32.31     Objective Measure: AROM in standing   Details: 21   Flex 137 w/ pain, Abd 137 w/ pain, ER 69 w/ pain, IR T9 w/ Pain. INITIALLY:  Flex 122 w/ pain, Abd 147 w/ pain, ER 59 w/pain, IR T8.     Objective Measure: MMT  Details: 21  E.Can 4+ w/pain, Elbow Flex 5- w/ Pain. . INITIALlY:  E.Can and Elbow flex 4 w/pain.     Objective Measure: Palpation   Details: R UTrap, Coracoid, Supraspin Tendon      Goals:  Goal Identifier 1   Goal Description STG: Pt will be able to sleep w/pain 2/10 or less.    Target Date 21   Date Met  21   Progress (detail required for progress note): 21  MET      Goal Identifier 2   Goal Description STG: Pt will be able to wsh her back 3/10 on SPADI.    Target Date 21   Date Met      Progress (detail required for progress note):       Goal Identifier 3   Goal Description STG: Pt will be able to put on pullover shirt 3/10 per SPADI    Target Date 21   Date Met      Progress (detail required for progress note):       Goal Identifier 4   Goal Description LTG: Pt will be independent w/ HEP and self cares to manage shoulder pain.    Target Date 21   Date Met      Progress (detail required for progress note):       Plan:  Discharge from therapy.    Discharge:    Reason for Discharge: Patient has failed to schedule further  appointments.    Equipment Issued:      Discharge Plan: Patient to continue home program.  Pt to follow up w/MD as appropriate.   Daina Van, PT, [facility] (#[id number])  [facility]           [phone]  Fax          [phone]  Appt #      [phone]

## 2021-11-12 NOTE — TELEPHONE ENCOUNTER
I have placed orders for bilateral screening mammograms. I have also placed an order for diagnostic mammogram on the right as having right breast pain.     Mat Yee, DO

## 2021-11-12 NOTE — TELEPHONE ENCOUNTER
"Dr Yee,    Called pt to clarify. Pt states went in for yearly 3D mammogram but was unable to complete due to right breast \"tenderness\" x 3 days. Pt states , \"not as bad\", pencil-tip sized pink area under right nipple.  Since she was due for yearly mammogram she would need bilateral diagnostic?  Please advise.    Pinky Fox RN    "

## 2021-11-23 ENCOUNTER — ANCILLARY PROCEDURE (OUTPATIENT)
Dept: GENERAL RADIOLOGY | Facility: CLINIC | Age: 56
End: 2021-11-23
Attending: FAMILY MEDICINE
Payer: COMMERCIAL

## 2021-11-23 ENCOUNTER — OFFICE VISIT (OUTPATIENT)
Dept: FAMILY MEDICINE | Facility: CLINIC | Age: 56
End: 2021-11-23
Payer: COMMERCIAL

## 2021-11-23 VITALS
OXYGEN SATURATION: 96 % | SYSTOLIC BLOOD PRESSURE: 130 MMHG | HEIGHT: 66 IN | RESPIRATION RATE: 16 BRPM | WEIGHT: 198 LBS | BODY MASS INDEX: 31.82 KG/M2 | DIASTOLIC BLOOD PRESSURE: 80 MMHG | TEMPERATURE: 98.7 F | HEART RATE: 71 BPM

## 2021-11-23 DIAGNOSIS — G89.29 CHRONIC RIGHT SHOULDER PAIN: Primary | ICD-10-CM

## 2021-11-23 DIAGNOSIS — M25.511 CHRONIC RIGHT SHOULDER PAIN: Primary | ICD-10-CM

## 2021-11-23 DIAGNOSIS — M75.41 IMPINGEMENT SYNDROME, SHOULDER, RIGHT: ICD-10-CM

## 2021-11-23 DIAGNOSIS — M79.671 RIGHT FOOT PAIN: ICD-10-CM

## 2021-11-23 DIAGNOSIS — Z23 NEED FOR PROPHYLACTIC VACCINATION AND INOCULATION AGAINST INFLUENZA: ICD-10-CM

## 2021-11-23 DIAGNOSIS — Z23 NEED FOR VACCINATION: ICD-10-CM

## 2021-11-23 PROCEDURE — 90682 RIV4 VACC RECOMBINANT DNA IM: CPT | Performed by: FAMILY MEDICINE

## 2021-11-23 PROCEDURE — 90715 TDAP VACCINE 7 YRS/> IM: CPT | Performed by: FAMILY MEDICINE

## 2021-11-23 PROCEDURE — 90472 IMMUNIZATION ADMIN EACH ADD: CPT | Performed by: FAMILY MEDICINE

## 2021-11-23 PROCEDURE — 99213 OFFICE O/P EST LOW 20 MIN: CPT | Mod: 25 | Performed by: FAMILY MEDICINE

## 2021-11-23 PROCEDURE — 90471 IMMUNIZATION ADMIN: CPT | Performed by: FAMILY MEDICINE

## 2021-11-23 PROCEDURE — 73630 X-RAY EXAM OF FOOT: CPT | Mod: RT | Performed by: RADIOLOGY

## 2021-11-23 ASSESSMENT — ANXIETY QUESTIONNAIRES
6. BECOMING EASILY ANNOYED OR IRRITABLE: SEVERAL DAYS
3. WORRYING TOO MUCH ABOUT DIFFERENT THINGS: SEVERAL DAYS
4. TROUBLE RELAXING: NOT AT ALL
GAD7 TOTAL SCORE: 4
7. FEELING AFRAID AS IF SOMETHING AWFUL MIGHT HAPPEN: NOT AT ALL
7. FEELING AFRAID AS IF SOMETHING AWFUL MIGHT HAPPEN: NOT AT ALL
8. IF YOU CHECKED OFF ANY PROBLEMS, HOW DIFFICULT HAVE THESE MADE IT FOR YOU TO DO YOUR WORK, TAKE CARE OF THINGS AT HOME, OR GET ALONG WITH OTHER PEOPLE?: SOMEWHAT DIFFICULT
1. FEELING NERVOUS, ANXIOUS, OR ON EDGE: SEVERAL DAYS
2. NOT BEING ABLE TO STOP OR CONTROL WORRYING: SEVERAL DAYS
5. BEING SO RESTLESS THAT IT IS HARD TO SIT STILL: NOT AT ALL

## 2021-11-23 ASSESSMENT — PATIENT HEALTH QUESTIONNAIRE - PHQ9
SUM OF ALL RESPONSES TO PHQ QUESTIONS 1-9: 3
10. IF YOU CHECKED OFF ANY PROBLEMS, HOW DIFFICULT HAVE THESE PROBLEMS MADE IT FOR YOU TO DO YOUR WORK, TAKE CARE OF THINGS AT HOME, OR GET ALONG WITH OTHER PEOPLE: SOMEWHAT DIFFICULT
SUM OF ALL RESPONSES TO PHQ QUESTIONS 1-9: 3

## 2021-11-23 ASSESSMENT — MIFFLIN-ST. JEOR: SCORE: 1514.87

## 2021-11-23 ASSESSMENT — PAIN SCALES - GENERAL: PAINLEVEL: MILD PAIN (2)

## 2021-11-23 NOTE — PROGRESS NOTES
Assessment & Plan     Chronic right shoulder pain  Impingement syndrome, shoulder, right  Continues to have right shoulder pain despite doing physical therapy, conservative cares with Tylenol and ibuprofen also steroid injection.  Exam is consistent with shoulder impingement of the right shoulder, also with some possible bicep tendinosis.  Will refer to Orthopedics for further evaluation and cares.  - Orthopedic  Referral    Right foot pain  No new trauma or injury. Will obtain xrays and refer to Podiatry.   - XR Foot Right G/E 3 Views  - Orthopedic  Referral    Need for vaccination  - TDAP VACCINE (Adacel, Boostrix)  [[id number]]    Need for prophylactic vaccination and inoculation against influenza  - TDAP VACCINE (Adacel, Boostrix)  [[id number]]  - INFLUENZA QUAD, RECOMBINANT, P-FREE (RIV4) (FLUBLOK)      Follow up with Orthopedics  Follow up with Podiatry     The risks, benefits and treatment options of prescribed medications or other treatments have been discussed with the patient. The patient verbalized their understanding and should call or follow up if no improvement or if they develop further problems.      Mat Yee, [facility] is a 56 year old who presents for the following health issues     HPI     Pain History:  When did you first notice your pain? - More than 6 weeks   Have you seen this provider for your pain in the past?   Yes   Where in your body do you have pain? Right Shoulder, left shoulder, neck, right foot has a protrusion that is painful  Are you seeing anyone else for your pain? Yes - has seen physical therapy.     Right shoulder  Nagging pain at rest. Will have an exacerbation of pain with throwing underhand/sidearm.   Pain is worsened with overhead activities, and also internal rotation across the body.   No numbness or tingling in the hands or arms.   Reports having weakness bilaterally in the upper arms. Right side  "worse than left.   No radiation of the pain.   Prior subacromial steroid injection which improved the pain for a couple days but did not completely resolve it.   No prior surgeries on the shoulder or the neck.   Currently using Tylenol ( 1000 mg) arthritis and Ibuprofen ( 600 mg) twice daily.   Has tried voltaren gel without improvement.   Went to physical therapy for shoulder but did not improve symptoms.       Right lateral foot pain   Pain has been present since April of 2021   No initial injury or trauma.   Achy pain is constant.   Pain is worsened with walking.   Nothing seems to improve the pain.   No numbness or tingling.   Has tried to get a new pair of shoes recently without improvement.       PHQ-9 SCORE 3/17/2021 11/23/2021   PHQ-9 Total Score MyChart - 3 (Minimal depression)   PHQ-9 Total Score 9 3       BENJAMIN-7 SCORE 3/17/2021 11/23/2021   Total Score - 4 (minimal anxiety)   Total Score 4 4     Review of Systems   Constitutional, HEENT, cardiovascular, pulmonary, gi and gu systems are negative, except as otherwise noted.      Objective    /80 (BP Location: Right arm, Patient Position: Chair, Cuff Size: Adult Regular)   Pulse 71   Temp 98.7  F (37.1  C) (Tympanic)   Resp 16   Ht 1.676 m (5' 6\")   Wt 89.8 kg (198 lb)   LMP  (LMP Unknown)   SpO2 96%   BMI 31.96 kg/m    Body mass index is 31.96 kg/m .  Physical Exam   General: alert, cooperative, no acute distress   CV: RRR, no murmur  Neck: full range of motion in flexion and extension. Slightly limited range of motion with side bending bilaterally.   Right shoulder: No swelling erythema atrophy or deformity.  Tender palpation over the anterior shoulder and bicipital groove.  Range of motion is slightly limited in flexion with pain at 90 degrees, pain with abduction at 90 degrees.  Internal rotation slightly limited in the right compared to the left.  External rotation full.   strength strong.  Full strength with flexion and extension of " the elbow.  Positive Springer and positive Neer's.  Supraspinatus empty can testing negative.  Positive Yergason's on the right. Sensation intact.   Right foot: No overlying skin changes.  Tender over the lateral portion near base of fifth metatarsal. Sensation intact. Non-tender over lateral and medial malleoli.

## 2021-11-23 NOTE — PATIENT INSTRUCTIONS
Follow up with sports medicine for shoulder.     Xray of foot today.   Follow up with Podiatry.

## 2021-11-24 ASSESSMENT — ANXIETY QUESTIONNAIRES: GAD7 TOTAL SCORE: 4

## 2021-11-24 ASSESSMENT — PATIENT HEALTH QUESTIONNAIRE - PHQ9: SUM OF ALL RESPONSES TO PHQ QUESTIONS 1-9: 3

## 2021-12-01 ENCOUNTER — OFFICE VISIT (OUTPATIENT)
Dept: PODIATRY | Facility: CLINIC | Age: 56
End: 2021-12-01
Payer: COMMERCIAL

## 2021-12-01 VITALS
SYSTOLIC BLOOD PRESSURE: 126 MMHG | WEIGHT: 198 LBS | HEIGHT: 66 IN | DIASTOLIC BLOOD PRESSURE: 76 MMHG | BODY MASS INDEX: 31.82 KG/M2

## 2021-12-01 DIAGNOSIS — M79.671 RIGHT FOOT PAIN: ICD-10-CM

## 2021-12-01 DIAGNOSIS — M76.71 PERONEAL TENDONITIS, RIGHT: Primary | ICD-10-CM

## 2021-12-01 DIAGNOSIS — M72.2 PLANTAR FASCIITIS, RIGHT: ICD-10-CM

## 2021-12-01 PROCEDURE — 99203 OFFICE O/P NEW LOW 30 MIN: CPT | Performed by: PODIATRIST

## 2021-12-01 ASSESSMENT — MIFFLIN-ST. JEOR: SCORE: 1514.87

## 2021-12-01 NOTE — PATIENT INSTRUCTIONS
Next Steps:      1. Support:  a. Wear supportive shoes, sandals, boots and/or inserts that have a rigid supportive sole.    i. This will offload the majority of tension forces that travel through your feet every step you take.    1. Skechers Max Cushioning Elite/Premier   2. Skechers Relax Fit D'Lux Walker  3. Reebok Walk Ultra 7 DMX MAX   4. Hoka Bondi walking shoes  5. Superfeet inserts ([email or web address])  b. It is important that you also wear supportive shoe wear in the house to continue providing support to your feet.    c. You may always use a cushioned liner for your shoes if that makes your feet feel better.  2. Stretching  a. Calf stretching is essential to offload the tension forces that travel through your feet every step you take  b. Preferred calf stretch is the Runner's Stretch  i. Place one foot behind the other foot, flat against the ground (it is important to keep the heel on the ground).  The back leg is the one that will be stretched.  1. Start with the knee straight and lean your hips into the wall, counter or whatever you are leaning into - count to ten.  2. Next, bend the knee.  You should feel the stretch lower in the calf muscle - count to ten.  c. Repeat this stretch once an hour to start off with.  When symptoms subside, I recommend performing the stretch 3 times daily to prevent any future problems.                3. Tissue Massage  a. It is important that you physically loosen the inflammation tissue to help your body heal the injured tissue.  b. I recommend soaking your foot in warm water to increase the microcirculation to the soft tissues.  You may add Epson salt to the water if you prefer.  c. You may apply an over-the-counter muscle rub, such as Voltaren gel, and deeply massage the injured tissue.  4. Reduce Inflammation  a. You can ice the injured tissue with an ice pack with a light cloth covering or soaking in ice water 20 minutes to reduce any acute inflammation, typically at the  end of the day.  b. If tolerated, you may take a Non-Steroidal Antiinflammatory medication (NSAID), such as Advil or Aleve, to help reduce the inflammation tissue.  This can help the overall healing of the injured tissue.  i. It is important to take food with any NSAID medication as the most common side effect is stomach irritation.  If you encounter any problems when taking NSAID, it is recommended that you stop taking the medication and notify your provider.    It is important to understand that most problems that develop in the foot and ankle are caused by excessive tension that cause microinjury to the soft tissues and inflammation in the foot and ankle.  By addressing the underlying causes with support and stretching as well as treating the current inflammatory conditions with tissue massage and anti-inflammatory treatments, most foot and ankle musculoskeletal conditions will resolve.  This may take time to heal.  However, if symptoms persist past 4 weeks you should return to the office for reevaluation to determine further treatment options.

## 2021-12-01 NOTE — LETTER
"    12/1/2021         RE: Leslie Turner  [address] [facility] [id number]        Dear Colleague,    Thank you for referring your patient, Leslie Turner, to the [facility] [place]. Please see a copy of my visit note below.    PATIENT HISTORY:  Leslie Turner is a 56 year old female who presents to clinic in consultation at the request of  Mat Yee D.O. with a chief complaint of painful right foot.  The patient is seen by themselves.  The patient relates the pain is primarily located around the bottom and outside of the right foot.  Reports insidious onset without acute precipitating event. that has been going on for several week(s).  The patient has previously tried different shoes with little relief. Any previous notes and studies that pertain to the patient's condition were reviewed.    Pertinent medical, surgical and family history was reviewed in Epic chart and include esophageal reflux, irritable bowel syndrome    Medications:   Current Outpatient Medications:      acetaminophen (TYLENOL) 500 MG tablet, Take 500-1,000 mg by mouth every 6 hours as needed for mild pain, Disp: , Rfl:      diclofenac (VOLTAREN) 1 % topical gel, Apply 4 g topically 4 times daily, Disp: 350 g, Rfl: 1     ibuprofen (ADVIL/MOTRIN) 800 MG tablet, Take 800 mg by mouth every 8 hours as needed for moderate pain, Disp: , Rfl:      FLAGYL 500 MG OR TABS, TAKE ONE TABLET 3 TIMES DAILY (Patient not taking: No sig reported), Disp: 21, Rfl: 0     PROTONIX 40 MG OR TBEC, ONE DAILY (Patient not taking: No sig reported), Disp: 3 MONTHS, Rfl: 1 YEAR     Allergies:    Allergies   Allergen Reactions     Codeine Nausea and Vomiting     Dizziness, and fever       Vitals: /76   Ht 1.676 m (5' 6\")   Wt 89.8 kg (198 lb)   LMP  (LMP Unknown)   BMI 31.96 kg/m    BMI= Body mass index is 31.96 kg/m .    LOWER EXTREMITY PHYSICAL EXAM    Dermatologic: Skin is intact to right lower extremity without " significant lesions, rash or abrasion.        Vascular: DP & PT pulses are intact & regular on the right.   CFT and skin temperature is normal to the right lower extremity.     Neurologic: Lower extremity sensation is intact to light touch.  No evidence of weakness in the right lower extremity.        Musculoskeletal: Patient is ambulatory without assistive device or brace.  No gross ankle deformity noted.  No foot or ankle joint effusion is noted.  Noted pain on palpation of the plantar aspect of the right heel at the insertion point of the plantar fascia.  No surrounding erythema or edema noted.  Noted pain along the peroneal tendon on the right foot and ankle.  No surrounding erythema or edema noted.  Noted tight gastroc complex on right lower extremity.    Diagnostics:  Radiographs included three views of the right foot demonstrating   no cortical erosions or periosteal elevation.  All joint margins appear stable.  There is no apparent fracture or tumor formation noted.  There is no evidence of foreign body.  The images were independently reviewed by myself along with the patient explaining the findings.      ASSESSMENT / PLAN:     ICD-10-CM    1. Peroneal tendonitis, right  M76.71 [facility]   2. Right foot pain  M79.671 Orthopedic  Referral   3. Plantar fasciitis, right  M72.2 [facility]       I have explained to Leslie about the conditions.  We discussed the underlying contributing factors to the condition as well as treatment options along with expected length of recovery.  At this time, the patient was educated on the importance of offloading supportive shoes and other devices.  I demonstrated to the patient calf stretches to perform every hour daily until symptoms resolve.  After symptoms resolve, the patient was advised to perform the stretches 3 times daily to prevent future recurrence.  The patient was instructed to perform warm soaks with Epson salt after  which to also apply over-the-counter Voltaren gel to deeply massage the injured tissue.  The patient was instructed to do this on a daily basis until symptoms resolve.  The patient may also take over-the-counter NSAID medication, if tolerated, to help further reduce the inflammation tissue.   The patient was advised to take this type of medication with food to prevent stomach irritation and to stop taking the medication if stomach irritation occurs.  The patient was prescribed custom orthotics that will aid in offloading the tension forces to the soft tissues and prevent further inflammation.  The patient will return in four weeks for reevaluation if the symptoms do not resolve.      Leslie verbalized agreement with and understanding of the rational for the diagnosis and treatment plan.  All questions were answered to best of my ability and the patient's satisfaction. The patient was advised to contact the clinic with any questions that may arise after the clinic visit.      Disclaimer: This note consists of symbols derived from keyboarding, dictation and/or voice recognition software. As a result, there may be errors in the script that have gone undetected. Please consider this when interpreting information found in this chart.       ALEX Espino.PJOSE., F.A.C.F.A.S.        Again, thank you for allowing me to participate in the care of your patient.        Sincerely,        Devon Vazquez DPM

## 2021-12-01 NOTE — PROGRESS NOTES
"PATIENT HISTORY:  Leslie Turner is a 56 year old female who presents to clinic in consultation at the request of  Mat Yee D.O. with a chief complaint of painful right foot.  The patient is seen by themselves.  The patient relates the pain is primarily located around the bottom and outside of the right foot.  Reports insidious onset without acute precipitating event. that has been going on for several week(s).  The patient has previously tried different shoes with little relief. Any previous notes and studies that pertain to the patient's condition were reviewed.    Pertinent medical, surgical and family history was reviewed in Epic chart and include esophageal reflux, irritable bowel syndrome    Medications:   Current Outpatient Medications:      acetaminophen (TYLENOL) 500 MG tablet, Take 500-1,000 mg by mouth every 6 hours as needed for mild pain, Disp: , Rfl:      diclofenac (VOLTAREN) 1 % topical gel, Apply 4 g topically 4 times daily, Disp: 350 g, Rfl: 1     ibuprofen (ADVIL/MOTRIN) 800 MG tablet, Take 800 mg by mouth every 8 hours as needed for moderate pain, Disp: , Rfl:      FLAGYL 500 MG OR TABS, TAKE ONE TABLET 3 TIMES DAILY (Patient not taking: No sig reported), Disp: 21, Rfl: 0     PROTONIX 40 MG OR TBEC, ONE DAILY (Patient not taking: No sig reported), Disp: 3 MONTHS, Rfl: 1 YEAR     Allergies:    Allergies   Allergen Reactions     Codeine Nausea and Vomiting     Dizziness, and fever       Vitals: /76   Ht 1.676 m (5' 6\")   Wt 89.8 kg (198 lb)   LMP  (LMP Unknown)   BMI 31.96 kg/m    BMI= Body mass index is 31.96 kg/m .    LOWER EXTREMITY PHYSICAL EXAM    Dermatologic: Skin is intact to right lower extremity without significant lesions, rash or abrasion.        Vascular: DP & PT pulses are intact & regular on the right.   CFT and skin temperature is normal to the right lower extremity.     Neurologic: Lower extremity sensation is intact to light touch.  No evidence of weakness in " the right lower extremity.        Musculoskeletal: Patient is ambulatory without assistive device or brace.  No gross ankle deformity noted.  No foot or ankle joint effusion is noted.  Noted pain on palpation of the plantar aspect of the right heel at the insertion point of the plantar fascia.  No surrounding erythema or edema noted.  Noted pain along the peroneal tendon on the right foot and ankle.  No surrounding erythema or edema noted.  Noted tight gastroc complex on right lower extremity.    Diagnostics:  Radiographs included three views of the right foot demonstrating   no cortical erosions or periosteal elevation.  All joint margins appear stable.  There is no apparent fracture or tumor formation noted.  There is no evidence of foreign body.  The images were independently reviewed by myself along with the patient explaining the findings.      ASSESSMENT / PLAN:     ICD-10-CM    1. Peroneal tendonitis, right  M76.71 [facility]   2. Right foot pain  M79.671 Orthopedic  Referral   3. Plantar fasciitis, right  M72.2 [facility]       I have explained to Leslie about the conditions.  We discussed the underlying contributing factors to the condition as well as treatment options along with expected length of recovery.  At this time, the patient was educated on the importance of offloading supportive shoes and other devices.  I demonstrated to the patient calf stretches to perform every hour daily until symptoms resolve.  After symptoms resolve, the patient was advised to perform the stretches 3 times daily to prevent future recurrence.  The patient was instructed to perform warm soaks with Epson salt after which to also apply over-the-counter Voltaren gel to deeply massage the injured tissue.  The patient was instructed to do this on a daily basis until symptoms resolve.  The patient may also take over-the-counter NSAID medication, if tolerated, to help further reduce  the inflammation tissue.   The patient was advised to take this type of medication with food to prevent stomach irritation and to stop taking the medication if stomach irritation occurs.  The patient was prescribed custom orthotics that will aid in offloading the tension forces to the soft tissues and prevent further inflammation.  The patient will return in four weeks for reevaluation if the symptoms do not resolve.      Leslie verbalized agreement with and understanding of the rational for the diagnosis and treatment plan.  All questions were answered to best of my ability and the patient's satisfaction. The patient was advised to contact the clinic with any questions that may arise after the clinic visit.      Disclaimer: This note consists of symbols derived from keyboarding, dictation and/or voice recognition software. As a result, there may be errors in the script that have gone undetected. Please consider this when interpreting information found in this chart.       ANNE MARIE Vazquez D.P.M., F.CATHY.VANNA.F.A.S.

## 2021-12-08 ENCOUNTER — OFFICE VISIT (OUTPATIENT)
Dept: ORTHOPEDICS | Facility: CLINIC | Age: 56
End: 2021-12-08
Payer: COMMERCIAL

## 2021-12-08 VITALS
SYSTOLIC BLOOD PRESSURE: 130 MMHG | BODY MASS INDEX: 31.82 KG/M2 | HEIGHT: 66 IN | WEIGHT: 198 LBS | DIASTOLIC BLOOD PRESSURE: 85 MMHG

## 2021-12-08 DIAGNOSIS — G89.29 CHRONIC RIGHT SHOULDER PAIN: ICD-10-CM

## 2021-12-08 DIAGNOSIS — M25.511 CHRONIC RIGHT SHOULDER PAIN: ICD-10-CM

## 2021-12-08 PROCEDURE — 99203 OFFICE O/P NEW LOW 30 MIN: CPT | Performed by: PEDIATRICS

## 2021-12-08 ASSESSMENT — MIFFLIN-ST. JEOR: SCORE: 1514.87

## 2021-12-08 NOTE — LETTER
12/8/2021         RE: Leslie Truner  [address] [facility] [address]        Dear Colleague,    Thank you for referring your patient, Leslie Turner, to the [facility] [place]. Please see a copy of my visit note below.    ASSESSMENT & PLAN    Leslie was seen today for pain.    Diagnoses and all orders for this visit:    Chronic right shoulder pain  -     Orthopedic  Referral  -     MR Shoulder Right w/o Contrast; Future      This issue is chronic and Unchanged.    We discussed these other possible diagnosis: concern for calcific tendinosis, possible rotator cuff tear  Given lack of improvement with PT and prior injection, will obtain MRI to evaluate. Continue supportive care in the interim, discussed possible next steps in treatment.    Plan:  - Today's Plan of Care:  MRI of the Right Shoulder - Call [phone] to schedule MRI  Discussed activity considerations and other supportive care including Ice/Heat, OTC and other topical medications as needed.  Continue Home Exercise Program    -We also discussed other future treatment options:  Consideration of US guided injection  Referral to Orthopedic Surgery  PT referral    Follow Up: will send results via "[employer]    Concerning signs and symptoms were reviewed.  The patient expressed understanding of this management plan and all questions were answered at this time.    Thanks for the opportunity to participate in the care of this patient, I will keep you updated on their progress.    CC: Mat Palacio MD [facility]  Sports Medicine Physician  [facility]      -----  Chief Complaint   Patient presents with     Right Shoulder - Pain       SUBJECTIVE  Leslie Turner is a/an 56 year old female who is seen in consultation at the request of  Mat Yee D.O.. for evaluation of right shoulder.     The patient is seen by themselves.    Onset: 8 month(s) ago. Reports insidious  "onset without acute precipitating event.  Location of Pain: right shoulder, anterior, biciptial groove, posterior over RTC   Worsened by: driving, sleeping, pulling, flexion of the shoulder, IR  Better with: tylenol, ibuprofen, voltaren gel  Treatments tried: rest/activity avoidance, ice, heat, Tylenol, home exercises, physical therapy (6 visits, no improvement), previous imaging (xray 5/18/21), corticosteroid injection 5/18/21 that provided less than a week of relief, and topical ointments  Associated symptoms: weakness of shoulder, locking or catching, feeling of instability and pain with specific movements    Orthopedic/Surgical history: YES - Chronic right shoulder pain, chronic left shoulder pain  Social History/Occupation:     No family history pertinent to patient's problem today.    REVIEW OF SYSTEMS:  Review of Systems  Skin: no bruising, no swelling  Musculoskeletal: as above  Neurologic: no numbness, paresthesias  Remainder of review of systems is negative including constitutional, CV, pulmonary, GI, except as noted in HPI or medical history.    OBJECTIVE:  /85   Ht 1.676 m (5' 6\")   Wt 89.8 kg (198 lb)   LMP  (LMP Unknown)   BMI 31.96 kg/m     General: healthy, alert and in no distress  HEENT: no scleral icterus or conjunctival erythema  Skin: no suspicious lesions or rash. No jaundice.  CV: distal perfusion intact  Resp: normal respiratory effort without conversational dyspnea   Psych: normal mood and affect  Gait: normal steady gait with appropriate coordination and balance  Neuro: Normal light sensory exam of upper extremity    Bilateral Shoulder exam    Inspection and Posture:       rounded shoulders and upper back    Skin:        no visible deformities    Tender:        subacromial space right    Non Tender:       remainder of shoulder bilateral    ROM:        Full active and passive ROM with flexion, extension, abduction, internal and external rotation right       " asymmetric scapular motion    Painful motions:       end range flexion and elevation right    Strength:        abduction 5/5 bilateral       flexion 5/5 bilateral       internal rotation 5/5 bilateral       external rotation 5/5 bilateral    Impingement testing:       neg (-) Neer right       positive (+) Springer right    Sensation:        normal sensation over shoulder and upper extremity     RADIOLOGY:  I independently  visualized and reviewed these images with the patient  2 XR views of right shoulder 5/18/2021 reviewed: no acute bony abnormality, no significant degenerative change, likely supraspinatus calcific tendinosis    Review of the result(s) of each unique test - XR           Again, thank you for allowing me to participate in the care of your patient.        Sincerely,        Radha Palacio MD

## 2021-12-08 NOTE — PATIENT INSTRUCTIONS
We discussed these other possible diagnosis: concern for calcific tendinosis, possible rotator cuff tear    Plan:  - Today's Plan of Care:  MRI of the Right Shoulder - Call [phone] to schedule MRI  Discussed activity considerations and other supportive care including Ice/Heat, OTC and other topical medications as needed.  Continue Home Exercise Program    -We also discussed other future treatment options:  Consideration of US guided injection  Referral to Orthopedic Surgery  PT referral    Follow Up: will send results via [employer]    If you have any further questions for your physician or physician s care team you can call [phone] and use option 3 to leave a voice message. Calls received during business hours will be returned same day.

## 2021-12-08 NOTE — PROGRESS NOTES
ASSESSMENT & PLAN    Leslie was seen today for pain.    Diagnoses and all orders for this visit:    Chronic right shoulder pain  -     Orthopedic  Referral  -     MR Shoulder Right w/o Contrast; Future      This issue is chronic and Unchanged.    We discussed these other possible diagnosis: concern for calcific tendinosis, possible rotator cuff tear  Given lack of improvement with PT and prior injection, will obtain MRI to evaluate. Continue supportive care in the interim, discussed possible next steps in treatment.    Plan:  - Today's Plan of Care:  MRI of the Right Shoulder - Call [phone] to schedule MRI  Discussed activity considerations and other supportive care including Ice/Heat, OTC and other topical medications as needed.  Continue Home Exercise Program    -We also discussed other future treatment options:  Consideration of US guided injection  Referral to Orthopedic Surgery  PT referral    Follow Up: will send results via [employer]    Concerning signs and symptoms were reviewed.  The patient expressed understanding of this management plan and all questions were answered at this time.    Thanks for the opportunity to participate in the care of this patient, I will keep you updated on their progress.    CC: Mat Palacio MD [facility]  Sports Medicine Physician  [facility]      -----  Chief Complaint   Patient presents with     Right Shoulder - Pain       SUBJECTIVE  Leslie Turner is a/an 56 year old female who is seen in consultation at the request of  Mat Yee D.O.. for evaluation of right shoulder.     The patient is seen by themselves.    Onset: 8 month(s) ago. Reports insidious onset without acute precipitating event.  Location of Pain: right shoulder, anterior, biciptial groove, posterior over RTC   Worsened by: driving, sleeping, pulling, flexion of the shoulder, IR  Better with: tylenol, ibuprofen, voltaren gel  Treatments tried:  "rest/activity avoidance, ice, heat, Tylenol, home exercises, physical therapy (6 visits, no improvement), previous imaging (xray 5/18/21), corticosteroid injection 5/18/21 that provided less than a week of relief, and topical ointments  Associated symptoms: weakness of shoulder, locking or catching, feeling of instability and pain with specific movements    Orthopedic/Surgical history: YES - Chronic right shoulder pain, chronic left shoulder pain  Social History/Occupation:     No family history pertinent to patient's problem today.    REVIEW OF SYSTEMS:  Review of Systems  Skin: no bruising, no swelling  Musculoskeletal: as above  Neurologic: no numbness, paresthesias  Remainder of review of systems is negative including constitutional, CV, pulmonary, GI, except as noted in HPI or medical history.    OBJECTIVE:  /85   Ht 1.676 m (5' 6\")   Wt 89.8 kg (198 lb)   LMP  (LMP Unknown)   BMI 31.96 kg/m     General: healthy, alert and in no distress  HEENT: no scleral icterus or conjunctival erythema  Skin: no suspicious lesions or rash. No jaundice.  CV: distal perfusion intact  Resp: normal respiratory effort without conversational dyspnea   Psych: normal mood and affect  Gait: normal steady gait with appropriate coordination and balance  Neuro: Normal light sensory exam of upper extremity    Bilateral Shoulder exam    Inspection and Posture:       rounded shoulders and upper back    Skin:        no visible deformities    Tender:        subacromial space right    Non Tender:       remainder of shoulder bilateral    ROM:        Full active and passive ROM with flexion, extension, abduction, internal and external rotation right       asymmetric scapular motion    Painful motions:       end range flexion and elevation right    Strength:        abduction 5/5 bilateral       flexion 5/5 bilateral       internal rotation 5/5 bilateral       external rotation 5/5 bilateral    Impingement testing:      "  neg (-) Neer right       positive (+) Springer right    Sensation:        normal sensation over shoulder and upper extremity     RADIOLOGY:  I independently  visualized and reviewed these images with the patient  2 XR views of right shoulder 5/18/2021 reviewed: no acute bony abnormality, no significant degenerative change, likely supraspinatus calcific tendinosis    Review of the result(s) of each unique test - XR

## 2021-12-09 ENCOUNTER — HOSPITAL ENCOUNTER (OUTPATIENT)
Dept: MAMMOGRAPHY | Facility: CLINIC | Age: 56
End: 2021-12-09
Attending: FAMILY MEDICINE
Payer: COMMERCIAL

## 2021-12-09 DIAGNOSIS — N64.4 BREAST PAIN: ICD-10-CM

## 2021-12-09 PROCEDURE — 77062 BREAST TOMOSYNTHESIS BI: CPT

## 2021-12-13 ENCOUNTER — HOSPITAL ENCOUNTER (OUTPATIENT)
Dept: MRI IMAGING | Facility: CLINIC | Age: 56
Discharge: HOME | End: 2021-12-13
Attending: PEDIATRICS | Admitting: PEDIATRICS
Payer: COMMERCIAL

## 2021-12-13 DIAGNOSIS — M25.511 CHRONIC RIGHT SHOULDER PAIN: ICD-10-CM

## 2021-12-13 DIAGNOSIS — G89.29 CHRONIC RIGHT SHOULDER PAIN: ICD-10-CM

## 2021-12-13 PROCEDURE — 73221 MRI JOINT UPR EXTREM W/O DYE: CPT | Mod: RT

## 2021-12-13 PROCEDURE — 73221 MRI JOINT UPR EXTREM W/O DYE: CPT | Mod: 26 | Performed by: RADIOLOGY

## 2021-12-14 NOTE — RESULT ENCOUNTER NOTE
Reviewed and notified of results via [employer]Oliverio Nelson,  Please see the results of your MRI.  Given the full thickness rotator cuff tear, we could consider orthopedic surgery referral. Let us know if you'd like this referral, otherwise, we will review this in more detail at your follow-up appointment. Let us know if you have questions.    Radha Palacio MD, CAQ  Primary Care Sports Medicine  [facility]

## 2021-12-15 ENCOUNTER — TELEPHONE (OUTPATIENT)
Dept: ORTHOPEDICS | Facility: CLINIC | Age: 56
End: 2021-12-15
Payer: COMMERCIAL

## 2021-12-15 DIAGNOSIS — G89.29 CHRONIC RIGHT SHOULDER PAIN: Primary | ICD-10-CM

## 2021-12-15 DIAGNOSIS — M25.511 CHRONIC RIGHT SHOULDER PAIN: Primary | ICD-10-CM

## 2021-12-15 NOTE — TELEPHONE ENCOUNTER
----- Message from Radha Palacio MD sent at 12/14/2021 11:07 AM CST -----  Reviewed and notified of results via [employer].Oliverio Nelson,  Please see the results of your MRI.  Given the full thickness rotator cuff tear, we could consider orthopedic surgery referral. Let us know if you'd like this referral, otherwise, we will review this in more detail at your follow-up appointment. Let us know if you have questions.    Radha Palacio MD, CAQ  Primary Care Sports Medicine  [facility]

## 2021-12-15 NOTE — TELEPHONE ENCOUNTER
Called, spoke with Leslie. MRI results and next steps were relayed to the patient. She has requested a surgical consultation be placed.  This order is placed and patient will cancel her f/u appointment with Dr. Palacio.  Patient is understanding of results and next steps discussed.    Brianna Betancourt ATC, CSCS

## 2022-01-10 ENCOUNTER — OFFICE VISIT (OUTPATIENT)
Dept: FAMILY MEDICINE | Facility: CLINIC | Age: 57
End: 2022-01-10
Payer: COMMERCIAL

## 2022-01-10 VITALS
BODY MASS INDEX: 32.4 KG/M2 | HEIGHT: 66 IN | DIASTOLIC BLOOD PRESSURE: 78 MMHG | OXYGEN SATURATION: 97 % | WEIGHT: 201.6 LBS | SYSTOLIC BLOOD PRESSURE: 118 MMHG | HEART RATE: 73 BPM | RESPIRATION RATE: 18 BRPM | TEMPERATURE: 98.5 F

## 2022-01-10 DIAGNOSIS — Z01.818 PREOP GENERAL PHYSICAL EXAM: Primary | ICD-10-CM

## 2022-01-10 DIAGNOSIS — M75.101 TEAR OF RIGHT ROTATOR CUFF, UNSPECIFIED TEAR EXTENT, UNSPECIFIED WHETHER TRAUMATIC: ICD-10-CM

## 2022-01-10 LAB
ANION GAP SERPL CALCULATED.3IONS-SCNC: 3 MMOL/L (ref 3–14)
BASOPHILS # BLD AUTO: 0 10E3/UL (ref 0–0.2)
BASOPHILS NFR BLD AUTO: 0 %
BUN SERPL-MCNC: 16 MG/DL (ref 7–30)
CALCIUM SERPL-MCNC: 9.1 MG/DL (ref 8.5–10.1)
CHLORIDE BLD-SCNC: 109 MMOL/L (ref 94–109)
CO2 SERPL-SCNC: 27 MMOL/L (ref 20–32)
CREAT SERPL-MCNC: 0.79 MG/DL (ref 0.52–1.04)
EOSINOPHIL # BLD AUTO: 0.1 10E3/UL (ref 0–0.7)
EOSINOPHIL NFR BLD AUTO: 2 %
ERYTHROCYTE [DISTWIDTH] IN BLOOD BY AUTOMATED COUNT: 12.1 % (ref 10–15)
GFR SERPL CREATININE-BSD FRML MDRD: 88 ML/MIN/1.73M2
GLUCOSE BLD-MCNC: 96 MG/DL (ref 70–99)
HCT VFR BLD AUTO: 42.5 % (ref 35–47)
HGB BLD-MCNC: 14.5 G/DL (ref 11.7–15.7)
LYMPHOCYTES # BLD AUTO: 1.4 10E3/UL (ref 0.8–5.3)
LYMPHOCYTES NFR BLD AUTO: 32 %
MCH RBC QN AUTO: 31.5 PG (ref 26.5–33)
MCHC RBC AUTO-ENTMCNC: 34.1 G/DL (ref 31.5–36.5)
MCV RBC AUTO: 92 FL (ref 78–100)
MONOCYTES # BLD AUTO: 0.3 10E3/UL (ref 0–1.3)
MONOCYTES NFR BLD AUTO: 7 %
NEUTROPHILS # BLD AUTO: 2.7 10E3/UL (ref 1.6–8.3)
NEUTROPHILS NFR BLD AUTO: 59 %
PLATELET # BLD AUTO: 227 10E3/UL (ref 150–450)
POTASSIUM BLD-SCNC: 3.8 MMOL/L (ref 3.4–5.3)
RBC # BLD AUTO: 4.6 10E6/UL (ref 3.8–5.2)
SODIUM SERPL-SCNC: 139 MMOL/L (ref 133–144)
WBC # BLD AUTO: 4.5 10E3/UL (ref 4–11)

## 2022-01-10 PROCEDURE — 36415 COLL VENOUS BLD VENIPUNCTURE: CPT | Performed by: FAMILY MEDICINE

## 2022-01-10 PROCEDURE — 80048 BASIC METABOLIC PNL TOTAL CA: CPT | Performed by: FAMILY MEDICINE

## 2022-01-10 PROCEDURE — 85025 COMPLETE CBC W/AUTO DIFF WBC: CPT | Performed by: FAMILY MEDICINE

## 2022-01-10 PROCEDURE — 99214 OFFICE O/P EST MOD 30 MIN: CPT | Performed by: FAMILY MEDICINE

## 2022-01-10 ASSESSMENT — PAIN SCALES - GENERAL: PAINLEVEL: MODERATE PAIN (5)

## 2022-01-10 ASSESSMENT — MIFFLIN-ST. JEOR: SCORE: 1531.2

## 2022-01-10 NOTE — PROGRESS NOTES
[facility]  [address] [address]  Phone: [phone]  Primary Provider: Mat Yee  Pre-op Performing Provider: CONSTANCE LAN      PREOPERATIVE EVALUATION:  Today's date: 1/10/2022    Leslie Turner is a 56 year old female who presents for a preoperative evaluation.    Surgical Information:  Surgery/Procedure: torn rotator cuff Right  Surgery Location: [facility]  Surgeon: Dr. Baldo Poole  Surgery Date: 01/21/2022  Time of Surgery: TBD  Where patient plans to recover: At home with family  Fax number for surgical facility: [phone]    Type of Anesthesia Anticipated: to be determined    Assessment & Plan     The proposed surgical procedure is considered INTERMEDIATE risk.    Preop general physical exam  Patient is a 56 yr old female here for pre op evaluation. She is having a right rotator cuff repair. Patient is cleared for surgery.  - Basic metabolic panel  (Ca, Cl, CO2, Creat, Gluc, K, Na, BUN)  - CBC with platelets and differential    Tear of right rotator cuff, unspecified tear extent, unspecified whether traumatic  Patient cleared for surgery.           Risks and Recommendations:  The patient has the following additional risks and recommendations for perioperative complications:   - No identified additional risk factors other than previously addressed    Medication Instructions:   - aspirin: Discontinue aspirin 7-10 days prior to procedure to reduce bleeding risk. It should be resumed postoperatively.    - ibuprofen (Advil, Motrin): HOLD 1 day before surgery.    - naproxen (Aleve, Naprosyn): HOLD 4 days before surgery.     RECOMMENDATION  Cleared for surgery.    20 minutes spent on the date of the encounter doing chart review, patient visit and documentation         Subjective     HPI related to upcoming procedure: 69-year-old female presenting for preop evaluation for right rotator cuff repair.  She has no  significant past medical history.  Has had surgery in the past had no complications to surgery.  Allergies were reviewed.  She is not on any routine medications.  No systemic symptoms reported today.  No history of heart disease that she is aware of.    Preop Questions 1/7/2022   1. Have you ever had a heart attack or stroke? No   2. Have you ever had surgery on your heart or blood vessels, such as a stent placement, a coronary artery bypass, or surgery on an artery in your head, neck, heart, or legs? No   3. Do you have chest pain with activity? No   4. Do you have a history of  heart failure? No   5. Do you currently have a cold, bronchitis or symptoms of other infection? No   6. Do you have a cough, shortness of breath, or wheezing? No   7. Do you or anyone in your family have previous history of blood clots? No   8. Do you or does anyone in your family have a serious bleeding problem such as prolonged bleeding following surgeries or cuts? No   9. Have you ever had problems with anemia or been told to take iron pills? No   10. Have you had any abnormal blood loss such as black, tarry or bloody stools, or abnormal vaginal bleeding? YES - not currently   11. Have you ever had a blood transfusion? No   12. Are you willing to have a blood transfusion if it is medically needed before, during, or after your surgery? Yes   13. Have you or any of your relatives ever had problems with anesthesia? No -    14. Do you have sleep apnea, excessive snoring or daytime drowsiness? No   15. Do you have any artifical heart valves or other implanted medical devices like a pacemaker, defibrillator, or continuous glucose monitor? No   16. Do you have artificial joints? No   17. Are you allergic to latex? No   18. Is there any chance that you may be pregnant? No       Health Care Directive:  Patient does not have a Health Care Directive or Living Will: Discussed advance care planning with patient; however, patient declined at this  time.    Preoperative Review of :   reviewed - no record of controlled substances prescribed.      Status of Chronic Conditions:  See problem list for active medical problems.  Problems all longstanding and stable, except as noted/documented.  See ROS for pertinent symptoms related to these conditions.      Review of Systems  CONSTITUTIONAL: NEGATIVE for fever, chills, change in weight  INTEGUMENTARY/SKIN: NEGATIVE for worrisome rashes, moles or lesions  EYES: NEGATIVE for vision changes or irritation  ENT/MOUTH: NEGATIVE for ear, mouth and throat problems  RESP: NEGATIVE for significant cough or SOB  CV: NEGATIVE for chest pain, palpitations or peripheral edema  GI: NEGATIVE for nausea, abdominal pain, heartburn, or change in bowel habits  : NEGATIVE for frequency, dysuria, or hematuria  MUSCULOSKELETAL:POSITIVE  for joint pain right shoulder pain  ENDOCRINE: NEGATIVE for temperature intolerance, skin/hair changes  HEME: NEGATIVE for bleeding problems  PSYCHIATRIC: NEGATIVE for changes in mood or affect    Patient Active Problem List    Diagnosis Date Noted     Irritable bowel syndrome with constipation 02/01/2018     Priority: Medium     Depressive disorder, not elsewhere classified 05/11/2007     Priority: Medium     Esophageal reflux 05/11/2007     Priority: Medium     CTS (carpal tunnel syndrome) 06/26/2004     Priority: Medium     History of right knee surgery 01/21/2003     Priority: Medium      Past Medical History:   Diagnosis Date     Arthritis      Backache, unspecified      Depressive disorder      Depressive disorder, not elsewhere classified      Dysmenorrhea      Esophageal reflux      Irritable bowel syndrome      Past Surgical History:   Procedure Laterality Date     COLONOSCOPY  2018     HERNIA REPAIR  1967     SURGICAL HISTORY OF -       HEMORRHOIDECTOMY     SURGICAL HISTORY OF -       ANAL FISTULA, SPHINCTEROTOMY     SURGICAL HISTORY OF -   2005    CARPAL TUNNEL RELEASE     SURGICAL  "HISTORY OF -       HERNIA     SURGICAL HISTORY OF -   1995    BTL     SURGICAL HISTORY OF -       hyst     Current Outpatient Medications   Medication Sig Dispense Refill     acetaminophen (TYLENOL) 500 MG tablet Take 500-1,000 mg by mouth every 6 hours as needed for mild pain       diclofenac (VOLTAREN) 1 % topical gel Apply 4 g topically 4 times daily 350 g 1     ibuprofen (ADVIL/MOTRIN) 800 MG tablet Take 800 mg by mouth every 8 hours as needed for moderate pain         Allergies   Allergen Reactions     Codeine Nausea and Vomiting     Dizziness, and fever        Social History     Tobacco Use     Smoking status: Never Smoker     Smokeless tobacco: Never Used     Tobacco comment: Patient quit in 1995,  smokes in car and outside   Substance Use Topics     Alcohol use: Not Currently     Family History   Problem Relation Age of Onset     Diabetes Mother      Arthritis Mother      Kidney Disease Mother      Cancer Maternal Grandmother      Alzheimer Disease Maternal Grandmother      History   Drug Use No         Objective     /78 (BP Location: Left arm, Patient Position: Sitting, Cuff Size: Adult Large)   Pulse 73   Temp 98.5  F (36.9  C) (Tympanic)   Resp 18   Ht 1.676 m (5' 6\")   Wt 91.4 kg (201 lb 9.6 oz)   LMP  (LMP Unknown)   SpO2 97%   Breastfeeding No   BMI 32.54 kg/m      Physical Exam    GENERAL APPEARANCE: healthy, alert and no distress     EYES: EOMI, PERRL     HENT: ear canals and TM's normal and nose and mouth without ulcers or lesions     NECK: no adenopathy, no asymmetry, masses, or scars and thyroid normal to palpation     RESP: lungs clear to auscultation - no rales, rhonchi or wheezes     CV: regular rates and rhythm, normal S1 S2, no S3 or S4 and no murmur, click or rub     ABDOMEN:  soft, nontender, no HSM or masses and bowel sounds normal     MS: decreased range of motion right shoulder     SKIN: no suspicious lesions or rashes     PSYCH: mentation appears normal. and affect " normal/bright     LYMPHATICS: No cervical adenopathy    Recent Labs   Lab Test 09/22/21  1120   HGB 14.9   *      POTASSIUM 3.7   CR 0.86        Diagnostics:  Recent Results (from the past 24 hour(s))   Basic metabolic panel  (Ca, Cl, CO2, Creat, Gluc, K, Na, BUN)    Collection Time: 01/10/22  9:51 AM   Result Value Ref Range    Sodium 139 133 - 144 mmol/L    Potassium 3.8 3.4 - 5.3 mmol/L    Chloride 109 94 - 109 mmol/L    Carbon Dioxide (CO2) 27 20 - 32 mmol/L    Anion Gap 3 3 - 14 mmol/L    Urea Nitrogen 16 7 - 30 mg/dL    Creatinine 0.79 0.52 - 1.04 mg/dL    Calcium 9.1 8.5 - 10.1 mg/dL    Glucose 96 70 - 99 mg/dL    GFR Estimate 88 >60 mL/min/1.73m2   CBC with platelets and differential    Collection Time: 01/10/22  9:51 AM   Result Value Ref Range    WBC Count 4.5 4.0 - 11.0 10e3/uL    RBC Count 4.60 3.80 - 5.20 10e6/uL    Hemoglobin 14.5 11.7 - 15.7 g/dL    Hematocrit 42.5 35.0 - 47.0 %    MCV 92 78 - 100 fL    MCH 31.5 26.5 - 33.0 pg    MCHC 34.1 31.5 - 36.5 g/dL    RDW 12.1 10.0 - 15.0 %    Platelet Count 227 150 - 450 10e3/uL    % Neutrophils 59 %    % Lymphocytes 32 %    % Monocytes 7 %    % Eosinophils 2 %    % Basophils 0 %    Absolute Neutrophils 2.7 1.6 - 8.3 10e3/uL    Absolute Lymphocytes 1.4 0.8 - 5.3 10e3/uL    Absolute Monocytes 0.3 0.0 - 1.3 10e3/uL    Absolute Eosinophils 0.1 0.0 - 0.7 10e3/uL    Absolute Basophils 0.0 0.0 - 0.2 10e3/uL      No EKG required, no history of coronary heart disease, significant arrhythmia, peripheral arterial disease or other structural heart disease.    Revised Cardiac Risk Index (RCRI):  The patient has the following serious cardiovascular risks for perioperative complications:   - No serious cardiac risks = 0 points     RCRI Interpretation: 0 points: Class I (very low risk - 0.4% complication rate)           Signed Electronically by: Jb Cobb MD  Copy of this evaluation report is provided to requesting physician.

## 2022-01-10 NOTE — RESULT ENCOUNTER NOTE
Please inform patient that test result was within normal parameters.   Thank you.     Jb Cobb M.D.

## 2022-01-10 NOTE — PATIENT INSTRUCTIONS
Patient Education     Presurgery Checklist  You are scheduled to have surgery. The healthcare staff will try to make your stay comfortable. Use the guidelines below to remind yourself what to do before surgery. Be sure to follow any specific pre-op instructions from your surgeon or nurse.  Preparing for surgery    If you are having abdominal surgery, ask what you need to do to clear your bowel.    Tell your surgeon if you have allergies to any medicines, latex, or foods.    Ask your surgeon if you might need a blood transfusion during surgery and if so, how to prepare for it. In some cases, you can donate blood before surgery. If needed, this blood can be given back (transfused) to you during or after surgery.    Arrange for an adult family member or friend to drive you home after surgery. If possible, have someone ready to help you at home as you recover.    Call the surgeon if you get a cold, fever, sore throat, diarrhea, or other health problem just before surgery. Your surgeon can decide whether or not to postpone the surgery.  Medicines    Tell your surgeon about all medicines you take, including prescription and over-the-counter products such as herbal remedies and vitamins. Ask if you should continue taking them.    If you take ibuprofen, naproxen, or blood thinners (anticoagulants) such as aspirin, clopidogrel, or warfarin, ask your surgeon whether you should stop taking them and how long before surgery you should stop.    You may be told to take antibiotics just before surgery to prevent infection. If so, follow instructions carefully on how to take them.    If you are told to take blood thinners to help prevent blood clots after surgery, be sure to follow the instructions on how to take them.  Stop smoking  If you smoke, healing may take longer. So at least 2 week(s) before surgery, stop smoking.  Bathing or showering before surgery    If instructed, wash with antibacterial soap. Afterward, do not use  lotions, oils, or powders.    If you are having surgery on the head, you may be asked to shampoo with antibacterial soap. Follow instructions for doing so.  Do not remove hair from the surgery site  Do not shave hair from the incision site, unless you are given specific instructions to do so. Usually, if hair needs to be removed, it will be done at the hospital right before surgery.  Don t eat or drink    Follow any directions you are given for not eating or drinking before surgery. If you don't follow instructions about when to stop eating and drinking, your procedure may be postponed or rescheduled for another day. This is a safety issue.    You can brush your teeth and rinse your mouth, but don t swallow any water.  Day of surgery    Don't wear makeup. Don't use perfume, deodorant, or hairspray. Remove nail polish and artificial nails.    Leave jewelry (including rings), watches, and other valuables at home.    Be sure to bring health insurance cards or forms and a photo ID.    Bring a list of your medicines (include the name, dose, how often you take them, and the time last dose was taken).    Arrive on time at the hospital or surgery facility.  Date Last Reviewed: 12/1/2016 2000-2018 [employer]. [address] [place] [address], [place] [address]. All rights reserved. This information is not intended as a substitute for professional medical care. Always follow your healthcare professional's instructions.

## 2022-02-03 ENCOUNTER — TRANSCRIBE ORDERS (OUTPATIENT)
Dept: OTHER | Age: 57
End: 2022-02-03
Payer: COMMERCIAL

## 2022-02-28 ENCOUNTER — HOSPITAL ENCOUNTER (OUTPATIENT)
Dept: PHYSICAL THERAPY | Facility: CLINIC | Age: 57
Setting detail: THERAPIES SERIES
End: 2022-02-28
Attending: OBSTETRICS & GYNECOLOGY
Payer: COMMERCIAL

## 2022-02-28 PROCEDURE — 97161 PT EVAL LOW COMPLEX 20 MIN: CPT | Mod: GP | Performed by: PHYSICAL THERAPIST

## 2022-02-28 PROCEDURE — 97110 THERAPEUTIC EXERCISES: CPT | Mod: GP | Performed by: PHYSICAL THERAPIST

## 2022-02-28 NOTE — PROGRESS NOTES
PHYSICAL THERAPY INITIAL EVALUATION   02/28/22 0800   General Information   Type of Visit Initial OP Ortho PT Evaluation   Start of Care Date 02/28/22   Referring Physician Clementina BAILEY   Patient/Family Goals Statement get use of arm back, decrease pain   Orders Evaluate and Treat   Orders Comment please work on passive and active assist 4 quaddrant stretching. Advance to active motion followed by strengthening. 1-2 visists per week for 12 visits total.   Date of Order 02/03/22   Certification Required? No   Medical Diagnosis s/p right shoulder RCR 1.21.22   Surgical/Medical history reviewed Yes   Precautions/Limitations no known precautions/limitations   Body Part(s)   Body Part(s) Shoulder   Presentation and Etiology   Pertinent history of current problem (include personal factors and/or comorbidities that impact the POC) Pain started last March. Tried PT without relief. s/p R RTC repair on 1/21/22. Is able to lift the arm a little better. Thinks she has a stitch coming out, sees  on Thursday. Discontinued use of the sling. Having weaned off mostly of the prescription medication. Taking tylenol and ibuprofen only, every 4-5 hours. Icing and heat occasionally.    Impairments A. Pain;B. Decreased WB tolerance;E. Decreased flexibility;F. Decreased strength and endurance   Functional Limitations perform activities of daily living;perform required work activities;perform desired leisure / sports activities   Symptom Location R shoulder   How/Where did it occur Other  (post-surgical)   Onset date of current episode/exacerbation 01/21/22   Chronicity New   Pain rating (0-10 point scale) Best (/10);Worst (/10)   Best (/10) 3   Worst (/10) 7   Pain quality B. Dull   Frequency of pain/symptoms A. Constant   Pain/symptoms exacerbated by C. Lifting;J. ADL;K. Home tasks   Pain/symptoms eased by E. Changing positions;I. OTC medication(s)   Progression of symptoms since onset: Improved   Prior Level of Function   Prior  Level of Function-Mobility independent   Prior Level of Function-ADLs independent   Current Level of Function   Patient role/employment history A. Employed   Employment Comments    Fall Risk Screen   Fall screen completed by PT   Have you fallen 2 or more times in the past year? No   Have you fallen and had an injury in the past year? No   Is patient a fall risk? No   Functional Scales   Functional Scales Other   Other Scales  SPADI: 78.46%   Shoulder Objective Findings   Integumentary  incisions are well healed. superior portion of lateral incision has stitch coming through, mild hypomobiltiy present throughout incision. Ant and post portals are well healed with good mobility.    Cervical Screen (ROM, quadrant) stiff w/ROM, argueta L rot, L SB   Shoulder ROM Comment Elbow and wrist/hand AROM WNL   Palpation incisional tenderness, tight and tender UT, pec, levator    Right Shoulder Flexion PROM 50   Right Shoulder Abduction PROM 50   Right Shoulder ER PROM 30   Right Shoulder IR PROM to chest    Side (if bilateral, select both right and left) Right   Planned Therapy Interventions   Planned Therapy Interventions joint mobilization;manual therapy;neuromuscular re-education;ROM;strengthening;stretching   Planned Modality Interventions   Planned Modality Interventions Electrical stimulation;Cryotherapy   Clinical Impression   Criteria for Skilled Therapeutic Interventions Met yes, treatment indicated   PT Diagnosis s/p R RTC repair 1/21/22   Influenced by the following impairments pain, decreased ROM, decreased strength, decreased flexibility   Functional limitations due to impairments reaching, lifting, carrying, ADLs   Clinical Presentation Stable/Uncomplicated   Clinical Presentation Rationale uncomplicated post-surgical course thus far, improving since surgery   Clinical Decision Making (Complexity) Low complexity   Therapy Frequency 2 times/Week   Predicted Duration of Therapy Intervention (days/wks)  up to 6 weeks, 1x a week for 6 weeks, 1x every other week for 1-2 months    Risk & Benefits of therapy have been explained Yes   Patient, Family & other staff in agreement with plan of care Yes   Education Assessment   Preferred Learning Style Listening;Demonstration;Pictures/video   Barriers to Learning No barriers   ORTHO GOALS   PT Ortho Eval Goals 1;2;3;4   Ortho Goal 1   Goal Identifier 1   Goal Description Patient will be able to reach top shelf of cupboard with minimal pain or difficulty.    Target Date 04/11/22   Ortho Goal 2   Goal Identifier 2   Goal Description Patient will be able to perform dressing with minimal pain or difficulty.    Target Date 04/11/22   Ortho Goal 3   Goal Identifier 3   Goal Description Patient will be able to lift a gallon of milk into fridge with minimal pain or difficulty.    Target Date 06/28/22   Ortho Goal 4   Goal Identifier 4   Goal Description Patient will be independent with HEP to aid functional recovery.    Target Date 05/23/22   Total Evaluation Time   PT Ariel, Low Complexity Minutes (25445) 20       Please contact me with any questions or concerns.  Thank you for your referral.    Antonieta Zamudio, PT, DPT, OCS  Physical Therapist, Orthopedic Certified Specialist    [facility]  [address] [address]  [email or web address]   Office: [phone]   Employed by [facility]

## 2022-03-08 ENCOUNTER — TELEPHONE (OUTPATIENT)
Dept: FAMILY MEDICINE | Facility: CLINIC | Age: 57
End: 2022-03-08
Payer: COMMERCIAL

## 2022-03-08 ENCOUNTER — HOSPITAL ENCOUNTER (OUTPATIENT)
Dept: PHYSICAL THERAPY | Facility: CLINIC | Age: 57
Setting detail: THERAPIES SERIES
End: 2022-03-08
Attending: OBSTETRICS & GYNECOLOGY
Payer: COMMERCIAL

## 2022-03-08 PROCEDURE — 97110 THERAPEUTIC EXERCISES: CPT | Mod: GP | Performed by: PHYSICAL THERAPIST

## 2022-03-08 NOTE — TELEPHONE ENCOUNTER
Patient Quality Outreach    Patient is due for the following:   Colon Cancer Screening -  Colonoscopy  Cervical Cancer Screening - PAP Needed  Physical  - Due after 5/11/2008    NEXT STEPS:   Schedule a yearly physical    Type of outreach:    Sent [employer] message.  This is the 3rd outreach.    Questions for provider review:    None     Autumn Mccartney, [facility]

## 2022-03-11 ENCOUNTER — HOSPITAL ENCOUNTER (OUTPATIENT)
Dept: PHYSICAL THERAPY | Facility: CLINIC | Age: 57
Setting detail: THERAPIES SERIES
Discharge: HOME | End: 2022-03-11
Attending: OBSTETRICS & GYNECOLOGY
Payer: COMMERCIAL

## 2022-03-11 PROCEDURE — 97110 THERAPEUTIC EXERCISES: CPT | Mod: GP | Performed by: PHYSICAL THERAPIST

## 2022-03-14 ENCOUNTER — HOSPITAL ENCOUNTER (OUTPATIENT)
Dept: PHYSICAL THERAPY | Facility: CLINIC | Age: 57
Setting detail: THERAPIES SERIES
Discharge: HOME | End: 2022-03-14
Attending: OBSTETRICS & GYNECOLOGY
Payer: COMMERCIAL

## 2022-03-14 PROCEDURE — 97110 THERAPEUTIC EXERCISES: CPT | Mod: GP | Performed by: PHYSICAL THERAPIST

## 2022-03-18 ENCOUNTER — HOSPITAL ENCOUNTER (OUTPATIENT)
Dept: PHYSICAL THERAPY | Facility: CLINIC | Age: 57
Setting detail: THERAPIES SERIES
Discharge: HOME | End: 2022-03-18
Attending: OBSTETRICS & GYNECOLOGY
Payer: COMMERCIAL

## 2022-03-18 PROCEDURE — 97110 THERAPEUTIC EXERCISES: CPT | Mod: GP | Performed by: PHYSICAL THERAPIST

## 2022-03-22 ENCOUNTER — HOSPITAL ENCOUNTER (OUTPATIENT)
Dept: PHYSICAL THERAPY | Facility: CLINIC | Age: 57
Setting detail: THERAPIES SERIES
Discharge: HOME | End: 2022-03-22
Attending: PHYSICIAN ASSISTANT
Payer: COMMERCIAL

## 2022-03-22 PROCEDURE — 97110 THERAPEUTIC EXERCISES: CPT | Mod: GP | Performed by: PHYSICAL THERAPIST

## 2022-03-29 ENCOUNTER — HOSPITAL ENCOUNTER (OUTPATIENT)
Dept: PHYSICAL THERAPY | Facility: CLINIC | Age: 57
Setting detail: THERAPIES SERIES
Discharge: HOME | End: 2022-03-29
Attending: OBSTETRICS & GYNECOLOGY
Payer: COMMERCIAL

## 2022-03-29 PROCEDURE — 97110 THERAPEUTIC EXERCISES: CPT | Mod: GP | Performed by: PHYSICAL THERAPIST

## 2022-04-01 ENCOUNTER — HOSPITAL ENCOUNTER (OUTPATIENT)
Dept: PHYSICAL THERAPY | Facility: CLINIC | Age: 57
Setting detail: THERAPIES SERIES
Discharge: HOME | End: 2022-04-01
Attending: OBSTETRICS & GYNECOLOGY
Payer: COMMERCIAL

## 2022-04-01 PROCEDURE — 97110 THERAPEUTIC EXERCISES: CPT | Mod: GP | Performed by: PHYSICAL THERAPIST

## 2022-04-04 ENCOUNTER — HOSPITAL ENCOUNTER (OUTPATIENT)
Dept: PHYSICAL THERAPY | Facility: CLINIC | Age: 57
Setting detail: THERAPIES SERIES
Discharge: HOME | End: 2022-04-04
Attending: OBSTETRICS & GYNECOLOGY
Payer: COMMERCIAL

## 2022-04-04 PROCEDURE — 97110 THERAPEUTIC EXERCISES: CPT | Mod: GP | Performed by: PHYSICAL THERAPIST

## 2022-04-11 ENCOUNTER — HOSPITAL ENCOUNTER (OUTPATIENT)
Dept: PHYSICAL THERAPY | Facility: CLINIC | Age: 57
Setting detail: THERAPIES SERIES
Discharge: HOME | End: 2022-04-11
Attending: OBSTETRICS & GYNECOLOGY
Payer: COMMERCIAL

## 2022-04-11 PROCEDURE — 97110 THERAPEUTIC EXERCISES: CPT | Mod: GP | Performed by: PHYSICAL THERAPIST

## 2022-04-11 NOTE — PROGRESS NOTES
[facility]    Outpatient Physical Therapy Progress Note  Patient: Leslie Turner  : 1965    Beginning/End Dates of Reporting Period:  22 to 22    Referring Provider: Clementina BAILEY    Therapy Diagnosis: s/p right shoulder RCR 22     Client Self Report: Shoulder motion is getting better, still difficult without any assist.    Objective Measurements:  Objective Measure: R Shoulder AROM  Details: Flexion 115, Scaption 80 with shoulder hike, ER 80, IR L1    R Shoulder AAROM - Flexion 145 deg    Objective Measure: R Shoulder PROM  Details: Flexion 145, Abduction 100, ER(60) 45, IR(60) 65        Goals:  Goal Identifier 1   Goal Description Patient will be able to reach top shelf of cupboard with minimal pain or difficulty.    Target Date 22   Date Met      Progress (detail required for progress note): 115 degrees flexion, bottom cupboard     Goal Identifier 2   Goal Description Patient will be able to perform dressing with minimal pain or difficulty.    Target Date 22   Date Met      Progress (detail required for progress note): improving IR to L1     Goal Identifier 3   Goal Description Patient will be able to lift a gallon of milk into fridge with minimal pain or difficulty.    Target Date 22   Date Met      Progress (detail required for progress note): no weight yet, still active assist/active with ROM     Goal Identifier 4   Goal Description Patient will be independent with HEP to aid functional recovery.    Target Date 22   Date Met      Progress (detail required for progress note): excellent compliance with ex     Leslie is making steady progress in physical therapy. Pain was initially difficult to progress in the beginning but it is getting much better. Her active motion continues to improve each week and her active assisted motion is doing quite  well.      Plan:  Continue therapy per current plan of care.    Discharge:  No      Please contact me with any questions or concerns.  Thank you for your referral.    Antonieta Zamudio, PT, DPT, OCS  Physical Therapist, Orthopedic Certified Specialist    [facility]  [address] [place] [id number]  [email or web address]   Office: [phone]   Employed by [employer]

## 2022-04-24 ENCOUNTER — HEALTH MAINTENANCE LETTER (OUTPATIENT)
Age: 57
End: 2022-04-24

## 2022-04-25 ENCOUNTER — HOSPITAL ENCOUNTER (OUTPATIENT)
Dept: PHYSICAL THERAPY | Facility: CLINIC | Age: 57
Setting detail: THERAPIES SERIES
Discharge: HOME | End: 2022-04-25
Attending: OBSTETRICS & GYNECOLOGY
Payer: COMMERCIAL

## 2022-04-25 PROCEDURE — 97110 THERAPEUTIC EXERCISES: CPT | Mod: GP | Performed by: PHYSICAL THERAPIST

## 2022-07-07 ASSESSMENT — PATIENT HEALTH QUESTIONNAIRE - PHQ9
SUM OF ALL RESPONSES TO PHQ QUESTIONS 1-9: 7
SUM OF ALL RESPONSES TO PHQ QUESTIONS 1-9: 7
10. IF YOU CHECKED OFF ANY PROBLEMS, HOW DIFFICULT HAVE THESE PROBLEMS MADE IT FOR YOU TO DO YOUR WORK, TAKE CARE OF THINGS AT HOME, OR GET ALONG WITH OTHER PEOPLE: SOMEWHAT DIFFICULT

## 2022-07-11 ENCOUNTER — OFFICE VISIT (OUTPATIENT)
Dept: FAMILY MEDICINE | Facility: CLINIC | Age: 57
End: 2022-07-11
Payer: OTHER MISCELLANEOUS

## 2022-07-11 VITALS
OXYGEN SATURATION: 97 % | HEART RATE: 59 BPM | SYSTOLIC BLOOD PRESSURE: 138 MMHG | TEMPERATURE: 98.6 F | RESPIRATION RATE: 16 BRPM | WEIGHT: 201 LBS | DIASTOLIC BLOOD PRESSURE: 86 MMHG | BODY MASS INDEX: 32.3 KG/M2 | HEIGHT: 66 IN

## 2022-07-11 DIAGNOSIS — M17.11 PRIMARY OSTEOARTHRITIS OF RIGHT KNEE: ICD-10-CM

## 2022-07-11 DIAGNOSIS — Z01.818 PREOP GENERAL PHYSICAL EXAM: Primary | ICD-10-CM

## 2022-07-11 LAB
ANION GAP SERPL CALCULATED.3IONS-SCNC: 1 MMOL/L (ref 3–14)
BUN SERPL-MCNC: 17 MG/DL (ref 7–30)
CALCIUM SERPL-MCNC: 9.4 MG/DL (ref 8.5–10.1)
CHLORIDE BLD-SCNC: 110 MMOL/L (ref 94–109)
CO2 SERPL-SCNC: 31 MMOL/L (ref 20–32)
CREAT SERPL-MCNC: 0.75 MG/DL (ref 0.52–1.04)
ERYTHROCYTE [DISTWIDTH] IN BLOOD BY AUTOMATED COUNT: 12.2 % (ref 10–15)
GFR SERPL CREATININE-BSD FRML MDRD: >90 ML/MIN/1.73M2
GLUCOSE BLD-MCNC: 102 MG/DL (ref 70–99)
HCT VFR BLD AUTO: 42.3 % (ref 35–47)
HGB BLD-MCNC: 14.3 G/DL (ref 11.7–15.7)
MCH RBC QN AUTO: 31 PG (ref 26.5–33)
MCHC RBC AUTO-ENTMCNC: 33.8 G/DL (ref 31.5–36.5)
MCV RBC AUTO: 92 FL (ref 78–100)
PLATELET # BLD AUTO: 219 10E3/UL (ref 150–450)
POTASSIUM BLD-SCNC: 4 MMOL/L (ref 3.4–5.3)
RBC # BLD AUTO: 4.62 10E6/UL (ref 3.8–5.2)
SODIUM SERPL-SCNC: 142 MMOL/L (ref 133–144)
WBC # BLD AUTO: 4.9 10E3/UL (ref 4–11)

## 2022-07-11 PROCEDURE — 85027 COMPLETE CBC AUTOMATED: CPT | Performed by: FAMILY MEDICINE

## 2022-07-11 PROCEDURE — 36415 COLL VENOUS BLD VENIPUNCTURE: CPT | Performed by: FAMILY MEDICINE

## 2022-07-11 PROCEDURE — 80048 BASIC METABOLIC PNL TOTAL CA: CPT | Performed by: FAMILY MEDICINE

## 2022-07-11 PROCEDURE — 99214 OFFICE O/P EST MOD 30 MIN: CPT | Performed by: FAMILY MEDICINE

## 2022-07-11 ASSESSMENT — PAIN SCALES - GENERAL: PAINLEVEL: MILD PAIN (3)

## 2022-07-11 NOTE — PROGRESS NOTES
[facility]  [address] [address]  Phone: [phone]  Primary Provider: Diana Yee  Pre-op Performing Provider: DIANA YEE      PREOPERATIVE EVALUATION:  Today's date: 7/11/2022    Leslie Turner is a 57 year old female who presents for a preoperative evaluation.    Surgical Information:  Surgery/Procedure: Total Right knee  Surgery Location: [facility]  Surgeon: Dr. Pooel  Surgery Date: 7/29/2022  Time of Surgery: TBD  Where patient plans to recover: At home with family  Fax number for surgical facility: [phone]    Type of Anesthesia Anticipated: General    Assessment & Plan     The proposed surgical procedure is considered INTERMEDIATE risk.    Preop general physical exam  Cleared for above surgery. CBC and BMP obtained and satisfactory.   - CBC with platelets  - Basic metabolic panel  (Ca, Cl, CO2, Creat, Gluc, K, Na, BUN)    Primary osteoarthritis of right knee  -- scheduled for surgery.     Component      Latest Ref Rng & Units 7/11/2022   Sodium      133 - 144 mmol/L 142   Potassium      3.4 - 5.3 mmol/L 4.0   Chloride      94 - 109 mmol/L 110 (H)   Carbon Dioxide      20 - 32 mmol/L 31   Anion Gap      3 - 14 mmol/L 1 (L)   Urea Nitrogen      7 - 30 mg/dL 17   Creatinine      0.52 - 1.04 mg/dL 0.75   Calcium      8.5 - 10.1 mg/dL 9.4   Glucose      70 - 99 mg/dL 102 (H)   GFR Estimate      >60 mL/min/1.73m2 >90   WBC      4.0 - 11.0 10e3/uL 4.9   RBC Count      3.80 - 5.20 10e6/uL 4.62   Hemoglobin      11.7 - 15.7 g/dL 14.3   Hematocrit      35.0 - 47.0 % 42.3   MCV      78 - 100 fL 92   MCH      26.5 - 33.0 pg 31.0   MCHC      31.5 - 36.5 g/dL 33.8   RDW      10.0 - 15.0 % 12.2   Platelet Count      150 - 450 10e3/uL 219         Risks and Recommendations:  Cleared to proceed with surgery.     Medication Instructions:   - ibuprofen (Advil, Motrin): HOLD 1 day before surgery.    - naproxen (Aleve, Naprosyn): HOLD 4 days  before surgery.     RECOMMENDATION:  APPROVAL GIVEN to proceed with proposed procedure, without further diagnostic evaluation.    The risks, benefits and treatment options of prescribed medications or other treatments have been discussed with the patient. The patient verbalized their understanding and should call or follow up if no improvement or if they develop further problems.      Subjective     HPI related to upcoming procedure:     Continues to have right knee pain and discomfort. Now with discomfort over the right shin as well.   Utilizing Tylenol and ibuprofen for pain.     Mets>4   No chest pain or shortness of breath at rest or with activity.       Preop Questions 7/7/2022   1. Have you ever had a heart attack or stroke? No   2. Have you ever had surgery on your heart or blood vessels, such as a stent placement, a coronary artery bypass, or surgery on an artery in your head, neck, heart, or legs? No   3. Do you have chest pain with activity? No   4. Do you have a history of  heart failure? No   5. Do you currently have a cold, bronchitis or symptoms of other infection? No   6. Do you have a cough, shortness of breath, or wheezing? No   7. Do you or anyone in your family have previous history of blood clots? No   8. Do you or does anyone in your family have a serious bleeding problem such as prolonged bleeding following surgeries or cuts? No   9. Have you ever had problems with anemia or been told to take iron pills? No   10. Have you had any abnormal blood loss such as black, tarry or bloody stools, or abnormal vaginal bleeding? No   11. Have you ever had a blood transfusion? No   12. Are you willing to have a blood transfusion if it is medically needed before, during, or after your surgery? Yes   13. Have you or any of your relatives ever had problems with anesthesia? No   14. Do you have sleep apnea, excessive snoring or daytime drowsiness? No   15. Do you have any artifical heart valves or other  implanted medical devices like a pacemaker, defibrillator, or continuous glucose monitor? No   16. Do you have artificial joints? No   17. Are you allergic to latex? No   18. Is there any chance that you may be pregnant? No       Health Care Directive:  Patient does not have a Health Care Directive or Living Will: Discussed advance care planning with patient; however, patient declined at this time.    Preoperative Review of :   reviewed - no record of controlled substances prescribed.      Status of Chronic Conditions:  See problem list for active medical problems.  Problems all longstanding and stable, except as noted/documented.  See ROS for pertinent symptoms related to these conditions.      Review of Systems  Constitutional, neuro, ENT, endocrine, pulmonary, cardiac, gastrointestinal, genitourinary, musculoskeletal, integument and psychiatric systems are negative, except as otherwise noted.    Patient Active Problem List    Diagnosis Date Noted     Irritable bowel syndrome with constipation 02/01/2018     Priority: Medium     Depressive disorder, not elsewhere classified 05/11/2007     Priority: Medium     Esophageal reflux 05/11/2007     Priority: Medium     CTS (carpal tunnel syndrome) 06/26/2004     Priority: Medium     History of right knee surgery 01/21/2003     Priority: Medium      Past Medical History:   Diagnosis Date     Arthritis      Backache, unspecified      Depressive disorder      Depressive disorder, not elsewhere classified      Dysmenorrhea      Esophageal reflux      Irritable bowel syndrome      Past Surgical History:   Procedure Laterality Date     COLONOSCOPY  2018     HERNIA REPAIR  1967     SOFT TISSUE SURGERY  1/2022    Rotator cuff     SURGICAL HISTORY OF -       HEMORRHOIDECTOMY     SURGICAL HISTORY OF -       ANAL FISTULA, SPHINCTEROTOMY     SURGICAL HISTORY OF -   2005    CARPAL TUNNEL RELEASE     SURGICAL HISTORY OF -       HERNIA     SURGICAL HISTORY OF -   1995    BTL      "SURGICAL HISTORY OF -       hyst     Current Outpatient Medications   Medication Sig Dispense Refill     acetaminophen (TYLENOL) 500 MG tablet Take 500-1,000 mg by mouth every 6 hours as needed for mild pain       diclofenac (VOLTAREN) 1 % topical gel Apply 4 g topically 4 times daily 350 g 1     ibuprofen (ADVIL/MOTRIN) 800 MG tablet Take 800 mg by mouth every 8 hours as needed for moderate pain         Allergies   Allergen Reactions     Codeine Nausea and Vomiting     Dizziness, and fever        Social History     Tobacco Use     Smoking status: Former Smoker     Packs/day: 0.00     Years: 1.00     Pack years: 0.00     Smokeless tobacco: Never Used     Tobacco comment: Patient quit in 1995,  smokes in car and outside   Substance Use Topics     Alcohol use: Not Currently         Objective     /86   Pulse 59   Temp 98.6  F (37  C) (Tympanic)   Resp 16   Ht 1.676 m (5' 6\")   Wt 91.2 kg (201 lb)   LMP  (LMP Unknown)   SpO2 97%   BMI 32.44 kg/m      Physical Exam    GENERAL APPEARANCE: healthy, alert and no distress     EYES: EOMI, PERRL     HENT: ear canals and TM's normal and nose and mouth without ulcers or lesions     NECK: no adenopathy, no asymmetry, masses, or scars and thyroid normal to palpation     RESP: lungs clear to auscultation - no rales, rhonchi or wheezes     CV: regular rates and rhythm, normal S1 S2, no S3 or S4 and no murmur, click or rub     ABDOMEN:  soft, nontender, no HSM or masses and bowel sounds normal     MS: extremities normal- no gross deformities noted, no evidence of inflammation in joints, FROM in all extremities.     SKIN: no suspicious lesions or rashes     NEURO: Normal strength and tone, sensory exam grossly normal, mentation intact and speech normal     PSYCH: mentation appears normal. and affect normal/bright     LYMPHATICS: No cervical adenopathy    Recent Labs   Lab Test 01/10/22  0951 09/22/21  1120   HGB 14.5 14.9    128*    139   POTASSIUM " 3.8 3.7   CR 0.79 0.86        Diagnostics:  Labs pending at this time.  Results will be reviewed when available.   No EKG required, no history of coronary heart disease, significant arrhythmia, peripheral arterial disease or other structural heart disease.    Revised Cardiac Risk Index (RCRI):  The patient has the following serious cardiovascular risks for perioperative complications:   - No serious cardiac risks = 0 points     RCRI Interpretation: 0 points: Class I (very low risk - 0.4% complication rate)           Signed Electronically by: Mat Yee DO  Copy of this evaluation report is provided to requesting physician.

## 2022-07-11 NOTE — PATIENT INSTRUCTIONS
Hold ibuprofen 1 day before surgery   Hold aleve 4 days before surgery     Lab work today.   Preparing for Your Surgery  Getting started  A nurse will call you to review your health history and instructions. They will give you an arrival time based on your scheduled surgery time. Please be ready to share:  Your doctor's clinic name and phone number  Your medical, surgical and anesthesia history  A list of allergies and sensitivities  A list of medicines, including herbal treatments and over-the-counter drugs  Whether the patient has a legal guardian (ask how to send us the papers in advance)  Please tell us if you're pregnant--or if there's any chance you might be pregnant. Some surgeries may injure a fetus (unborn baby), so they require a pregnancy test. Surgeries that are safe for a fetus don't always need a test, and you can choose whether to have one.   If you have a child who's having surgery, please ask for a copy of Preparing for Your Child's Surgery.    Preparing for surgery  Within 30 days of surgery: Have a pre-op exam (sometimes called an H&P, or History and Physical). This can be done at a clinic or pre-operative center.  If you're having a , you may not need this exam. Talk to your care team.  At your pre-op exam, talk to your care team about all medicines you take. If you need to stop any medicines before surgery, ask when to start taking them again.  We do this for your safety. Many medicines can make you bleed too much during surgery. Some change how well surgery (anesthesia) drugs work.  Call your insurance company to let them know you're having surgery. (If you don't have insurance, call [phone].)  Call your clinic if there's any change in your health. This includes signs of a cold or flu (sore throat, runny nose, cough, rash, fever). It also includes a scrape or scratch near the surgery site.  If you have questions on the day of surgery, call your hospital or surgery center.  COVID  testing  You may need to be tested for COVID-19 before having surgery. If so, we will give you instructions.  Eating and drinking guidelines  For your safety: Unless your surgeon tells you otherwise, follow the guidelines below.  Eat and drink as usual until 8 hours before surgery. After that, no food or milk.  Drink clear liquids until 2 hours before surgery. These are liquids you can see through, like water, Gatorade and Propel Water. You may also have black coffee and tea (no cream or milk).  Nothing by mouth within 2 hours of surgery. This includes gum, candy and breath mints.  If you drink alcohol: Stop drinking it the night before surgery.  If your care team tells you to take medicine on the morning of surgery, it's okay to take it with a sip of water.  Preventing infection  Shower or bathe the night before and morning of your surgery. Follow the instructions your clinic gave you. (If no instructions, use regular soap.)  Don't shave or clip hair near your surgery site. We'll remove the hair if needed.  Don't smoke or vape the morning of surgery. You may chew nicotine gum up to 2 hours before surgery. A nicotine patch is okay.  Note: Some surgeries require you to completely quit smoking and nicotine. Check with your surgeon.  Your care team will make every effort to keep you safe from infection. We will:  Clean our hands often with soap and water (or an alcohol-based hand rub).  Clean the skin at your surgery site with a special soap that kills germs.  Give you a special gown to keep you warm. (Cold raises the risk of infection.)  Wear special hair covers, masks, gowns and gloves during surgery.  Give antibiotic medicine, if prescribed. Not all surgeries need antibiotics.  What to bring on the day of surgery  Photo ID and insurance card  Copy of your health care directive, if you have one  Glasses and hearing aides (bring cases)  You can't wear contacts during surgery  Inhaler and eye drops, if you use them  (tell us about these when you arrive)  CPAP machine or breathing device, if you use them  A few personal items, if spending the night  If you have . . .  A pacemaker, ICD (cardiac defibrillator) or other implant: Bring the ID card.  An implanted stimulator: Bring the remote control.  A legal guardian: Bring a copy of the certified (court-stamped) guardianship papers.  Please remove any jewelry, including body piercings. Leave jewelry and other valuables at home.  If you're going home the day of surgery  You must have a responsible adult drive you home. They should stay with you overnight as well.  If you don't have someone to stay with you, and you aren't safe to go home alone, we may keep you overnight. Insurance often won't pay for this.  After surgery  If it's hard to control your pain or you need more pain medicine, please call your surgeon's office.  Questions?   If you have any questions for your care team, list them here: _________________________________________________________________________________________________________________________________________________________________________ ____________________________________ ____________________________________ ____________________________________  For informational purposes only. Not to replace the advice of your health care provider. Copyright   2003, 2019 [facility]. All rights reserved. Clinically reviewed by Dayami Garcia MD. [employer] [id number] - REV 07/21.

## 2022-07-19 ENCOUNTER — HOSPITAL ENCOUNTER (OUTPATIENT)
Dept: MRI IMAGING | Facility: CLINIC | Age: 57
Discharge: HOME | End: 2022-07-19
Attending: ORTHOPAEDIC SURGERY | Admitting: ORTHOPAEDIC SURGERY
Payer: COMMERCIAL

## 2022-07-19 DIAGNOSIS — M25.519 SHOULDER PAIN: ICD-10-CM

## 2022-07-19 PROCEDURE — 73221 MRI JOINT UPR EXTREM W/O DYE: CPT | Mod: LT

## 2022-08-03 ENCOUNTER — TRANSCRIBE ORDERS (OUTPATIENT)
Dept: OTHER | Age: 57
End: 2022-08-03

## 2022-08-03 DIAGNOSIS — Z96.651 S/P TOTAL KNEE ARTHROPLASTY, RIGHT: Primary | ICD-10-CM

## 2022-08-04 ENCOUNTER — HOSPITAL ENCOUNTER (OUTPATIENT)
Dept: PHYSICAL THERAPY | Facility: CLINIC | Age: 57
Setting detail: THERAPIES SERIES
Discharge: HOME | End: 2022-08-04
Attending: ORTHOPAEDIC SURGERY
Payer: OTHER MISCELLANEOUS

## 2022-08-04 DIAGNOSIS — Z96.651 S/P TOTAL KNEE ARTHROPLASTY, RIGHT: ICD-10-CM

## 2022-08-04 PROCEDURE — 97161 PT EVAL LOW COMPLEX 20 MIN: CPT | Mod: GP | Performed by: PHYSICAL MEDICINE & REHABILITATION

## 2022-08-04 PROCEDURE — 97110 THERAPEUTIC EXERCISES: CPT | Mod: GP | Performed by: PHYSICAL MEDICINE & REHABILITATION

## 2022-08-04 PROCEDURE — 97140 MANUAL THERAPY 1/> REGIONS: CPT | Mod: GP | Performed by: PHYSICAL MEDICINE & REHABILITATION

## 2022-08-04 NOTE — PROGRESS NOTES
"   08/04/22 1000   General Information   Type of Visit Initial OP Ortho PT Evaluation   Start of Care Date 08/04/22   Referring Physician Dr. Baldo Poole   Patient/Family Goals Statement Patient to be able to walk further distances and reduce pain with activity.   Orders Evaluate and Treat   Date of Order 08/03/22   Certification Required? Yes   Medical Diagnosis s/p R TKA   Surgical/Medical history reviewed Yes   Precautions/Limitations no known precautions/limitations   Weight-Bearing Status - RLE full weight-bearing   Body Part(s)   Body Part(s) Knee   Presentation and Etiology   Pertinent history of current problem (include personal factors and/or comorbidities that impact the POC) Patient is s/p R TKA on 7/29/22. Currently ambulating with rolling walker, performing HEP as tolerated, and going on short walks with . Reports \"tearing\" sensation when she bends knee and is very concerned about burning sensation as well. Is icing intermittently and taking pain medication due to high levels of pain.   Impairments A. Pain;C. Swelling;D. Decreased ROM;E. Decreased flexibility;F. Decreased strength and endurance;G. Impaired balance;J. Burning   Functional Limitations perform activities of daily living;perform required work activities;perform desired leisure / sports activities   Symptom Location R knee   How/Where did it occur Other   Chronicity New   Pain rating (0-10 point scale) Best (/10);Worst (/10)   Best (/10) 3   Worst (/10) 6   Pain quality B. Dull;C. Aching;D. Burning   Frequency of pain/symptoms A. Constant   Pain/symptoms are: The same all the time   Pain/symptoms exacerbated by C. Lifting;G. Certain positions   Pain/symptoms eased by I. OTC medication(s)   Progression of symptoms since onset: Unchanged   Current Level of Function   Patient role/employment history A. Employed   Employment Comments    Living environment House/townhome   Home/community accessibility 1 step to enter " living room, has full set of stairs but does not need to climb   Current equipment-Gait/Locomotion Walker   Fall Risk Screen   Fall screen completed by PT   Have you fallen 2 or more times in the past year? No   Have you fallen and had an injury in the past year? No   Is patient a fall risk? No   Abuse Screen (yes response referral indicated)   Feels Unsafe at Home or Work/School no   Feels Threatened by Someone no   Does Anyone Try to Keep You From Having Contact with Others or Doing Things Outside Your Home? no   Physical Signs of Abuse Present no   Knee Objective Findings   Gait/Locomotion mild limp with rolling walker   Balance/Proprioception (Single Leg Stance) unable   Knee ROM Comment stiff and guarded due to swelling   Knee/Hip Strength Comments reduced quad NM control   Palpation TTP at distal quad, posterior calf, and medial/lateral to incision   Observation distressed and worried about pain   Integumentary  warm, normal signs of healing with dressing, eccyhmosis present surrrounding knee/calf   Side (if bilateral, select both right and left) Right   Knee Special Test Comments known pathology   Right Knee Extension AROM -10*   Right Knee Flexion AROM 75*   Right Knee Flexion Strength 3-/5   Right Knee Extension Strength 2+/5   Right Hip Abduction Strength 3-/5   Right Quad Set Strength 2+/5   Right Gastrocnemius Flexibility stiff/guarded   Right Hamstring Flexibility stiff/guarded   Right Quadricep Flexibility stiff/painful   Planned Therapy Interventions   Planned Therapy Interventions stretching;strengthening;ROM;neuromuscular re-education;manual therapy;joint mobilization;gait training;balance training   Planned Modality Interventions   Planned Modality Interventions Biofeedback;Ultrasound   Clinical Impression   Criteria for Skilled Therapeutic Interventions Met yes, treatment indicated   PT Diagnosis s/p R TKA   Influenced by the following impairments pain, ROM, weakness   Functional limitations due  to impairments standing, walking, stairs   Clinical Presentation Stable/Uncomplicated   Clinical Presentation Rationale clinical judgement   Clinical Decision Making (Complexity) Low complexity   Therapy Frequency 2 times/Week   Predicted Duration of Therapy Intervention (days/wks) 10 weeks   Risk & Benefits of therapy have been explained Yes   Patient, Family & other staff in agreement with plan of care Yes   Clinical Impression Comments Patient presents to physical therapy s/p R TKA on 7/29/2022. Demonstrates with high levels of pain particularly with flexion and becomes frustrated due to inability to contract quadriceps. Ambulates with rolling walker at this time due to feeling unstable when walking without. Will benefit from PT to address functional deficits and return to PLOF. PT prognosis good due to high level of motivation and prior experience with PT.   Education Assessment   Preferred Learning Style Listening;Reading;Demonstration   Barriers to Learning No barriers   ORTHO GOALS   PT Ortho Eval Goals 1;2;3;4   Ortho Goal 1   Goal Identifier STG   Goal Description Patient to demonstrate 0 - 115* right knee AROM to improve functional mobility in community.   Target Date 09/08/22   Ortho Goal 2   Goal Identifier STG   Goal Description Patient to demonstrate min to no antalgic gait pattern without use of assistive device.   Target Date 09/08/22   Ortho Goal 3   Goal Identifier LTG   Goal Description Patient to exhibit full AROM of right knee 0 - 130* to improve ability to navigate stairs and any outdoor surface.   Target Date 10/13/22   Ortho Goal 4   Goal Identifier LTG   Goal Description Patient to report walking over 1 mile without right knee pain.   Target Date 10/13/22   Total Evaluation Time   PT Ariel Low Complexity Minutes (21224) 15   Therapy Certification   Certification date from 08/04/22   Certification date to 10/13/22   Medical Diagnosis s/p R TKA     Please contact me with any questions or  concerns.    Eduar Arroyo, PT, DPT, CSCS

## 2022-08-09 ENCOUNTER — HOSPITAL ENCOUNTER (OUTPATIENT)
Dept: PHYSICAL THERAPY | Facility: CLINIC | Age: 57
Setting detail: THERAPIES SERIES
Discharge: HOME | End: 2022-08-09
Attending: ORTHOPAEDIC SURGERY
Payer: OTHER MISCELLANEOUS

## 2022-08-09 PROCEDURE — 97110 THERAPEUTIC EXERCISES: CPT | Mod: GP | Performed by: PHYSICAL THERAPIST

## 2022-08-11 ENCOUNTER — HOSPITAL ENCOUNTER (OUTPATIENT)
Dept: PHYSICAL THERAPY | Facility: CLINIC | Age: 57
Setting detail: THERAPIES SERIES
Discharge: HOME | End: 2022-08-11
Attending: ORTHOPAEDIC SURGERY
Payer: OTHER MISCELLANEOUS

## 2022-08-11 PROCEDURE — 97110 THERAPEUTIC EXERCISES: CPT | Mod: GP | Performed by: PHYSICAL THERAPIST

## 2022-08-11 NOTE — PROGRESS NOTES
PHYSICAL THERAPY DISCHARGE NOTE  Patient did not return for follow up treatments.  Goal status and current objective information is therefore unknown.  The daily note from the patient's last visit will serve as the discharge note. Discharge from PT services at this time for this episode of treatment. Please see attached documentation under this episode of care for further information including dates of service, start of care date, referring physician, Dx, treatment plan, treatments, etc.    Please contact me with any questions or concerns.  Thank you for your referral.    Antonieta Zamudio, PT, DPT, OCS  Physical Therapist, Orthopedic Certified Specialist    [facility]  [address]   [address]  [place] [address]  [email or web address]   Office: [phone]   Employed by [employer]     04/25/22 0900   Signing Clinician's Name / Credentials   Signing clinician's name / credentials Antonieta Zamudio PT, DPT, OCS   Session Number   Session Number 12   Progress Note/Recertification   Progress Note Due Date 06/28/22   Adult Goals   PT Ortho Eval Goals 1;2;3;4   Ortho Goal 1   Goal Identifier 1   Goal Description Patient will be able to reach top shelf of cupboard with minimal pain or difficulty.    Goal Progress 115 degrees flexion, bottom cupboard   Target Date 04/11/22   Ortho Goal 2   Goal Identifier 2   Goal Description Patient will be able to perform dressing with minimal pain or difficulty.    Goal Progress improving IR to L1   Target Date 04/11/22   Ortho Goal 3   Goal Identifier 3   Goal Description Patient will be able to lift a gallon of milk into fridge with minimal pain or difficulty.    Goal Progress no weight yet, still active assist/active with ROM   Target Date 06/28/22   Ortho Goal 4   Goal Identifier 4   Goal Description Patient will be independent with HEP to aid functional recovery.    Goal Progress excellent compliance  with ex   Target Date 05/23/22   Subjective Report   Subjective Report Shoulder motion is getting better, still difficult without any assist.   Objective Measure 1   Objective Measure R Shoulder AROM   Objective Measure 2   Objective Measure R Shoulder PROM   Treatment Interventions   Interventions Therapeutic Procedure/Exercise;Manual Therapy   Therapeutic Procedure/exercise   Therapeutic Procedures: strength, endurance, ROM, flexibillity minutes (85157) 30   Skilled Intervention HEP progression, strengthening, ROM   Patient Response good progressions today, improving ROM   Treatment Detail UBE L3 x 3 min. Pulleys flexion and scaption. wand scaption in standing x 15. supine flexion 1# x 15. Wand ER x 20. Wand IR x 20. SL ER no weight x 20. Add 1# to bent over extension and row. PROM all directions.   Plan   Homework PTRX   Home program [id number]   Plan for next session prog ROM and light strength   Total Session Time   Timed Code Treatment Minutes 30   Total Treatment Time (sum of timed and untimed services) 30   AMBULATORY CLINICS ONLY-MEDICAL AND TREATMENT DIAGNOSIS   Medical Diagnosis s/p right shoulder RCR 1.21.22   PT Diagnosis s/p R RTC repair 1/21/22      room air

## 2022-08-16 ENCOUNTER — HOSPITAL ENCOUNTER (OUTPATIENT)
Dept: PHYSICAL THERAPY | Facility: CLINIC | Age: 57
Setting detail: THERAPIES SERIES
Discharge: HOME | End: 2022-08-16
Attending: ORTHOPAEDIC SURGERY
Payer: OTHER MISCELLANEOUS

## 2022-08-16 PROCEDURE — 97110 THERAPEUTIC EXERCISES: CPT | Mod: GP | Performed by: PHYSICAL THERAPIST

## 2022-08-18 ENCOUNTER — HOSPITAL ENCOUNTER (OUTPATIENT)
Dept: PHYSICAL THERAPY | Facility: CLINIC | Age: 57
Setting detail: THERAPIES SERIES
Discharge: HOME | End: 2022-08-18
Attending: ORTHOPAEDIC SURGERY
Payer: OTHER MISCELLANEOUS

## 2022-08-18 PROCEDURE — 97110 THERAPEUTIC EXERCISES: CPT | Mod: GP | Performed by: PHYSICAL THERAPIST

## 2022-08-23 ENCOUNTER — HOSPITAL ENCOUNTER (OUTPATIENT)
Dept: PHYSICAL THERAPY | Facility: CLINIC | Age: 57
Setting detail: THERAPIES SERIES
Discharge: HOME | End: 2022-08-23
Attending: ORTHOPAEDIC SURGERY
Payer: OTHER MISCELLANEOUS

## 2022-08-23 PROCEDURE — 97110 THERAPEUTIC EXERCISES: CPT | Mod: GP | Performed by: PHYSICAL THERAPIST

## 2022-08-26 ENCOUNTER — HOSPITAL ENCOUNTER (OUTPATIENT)
Dept: PHYSICAL THERAPY | Facility: CLINIC | Age: 57
Setting detail: THERAPIES SERIES
Discharge: HOME | End: 2022-08-26
Attending: ORTHOPAEDIC SURGERY
Payer: OTHER MISCELLANEOUS

## 2022-08-26 PROCEDURE — 97140 MANUAL THERAPY 1/> REGIONS: CPT | Mod: GP | Performed by: PHYSICAL MEDICINE & REHABILITATION

## 2022-08-26 PROCEDURE — 97110 THERAPEUTIC EXERCISES: CPT | Mod: GP | Performed by: PHYSICAL MEDICINE & REHABILITATION

## 2022-08-29 ENCOUNTER — TELEPHONE (OUTPATIENT)
Dept: FAMILY MEDICINE | Facility: CLINIC | Age: 57
End: 2022-08-29

## 2022-08-29 NOTE — TELEPHONE ENCOUNTER
Patient Quality Outreach    Patient is due for the following:   Colon Cancer Screening  Cervical Cancer Screening - PAP Needed  Physical Preventive Adult Physical    Next Steps:   Schedule a Adult Preventative    Type of outreach:    Sent Referral.IM message.      Questions for provider review:    None     Autumn Mccartney, [facility]

## 2022-08-30 ENCOUNTER — HOSPITAL ENCOUNTER (OUTPATIENT)
Dept: PHYSICAL THERAPY | Facility: CLINIC | Age: 57
Setting detail: THERAPIES SERIES
Discharge: HOME | End: 2022-08-30
Attending: ORTHOPAEDIC SURGERY
Payer: OTHER MISCELLANEOUS

## 2022-08-30 PROCEDURE — 97140 MANUAL THERAPY 1/> REGIONS: CPT | Mod: GP | Performed by: PHYSICAL THERAPIST

## 2022-08-30 PROCEDURE — 97110 THERAPEUTIC EXERCISES: CPT | Mod: GP | Performed by: PHYSICAL THERAPIST

## 2022-09-02 ENCOUNTER — HOSPITAL ENCOUNTER (OUTPATIENT)
Dept: PHYSICAL THERAPY | Facility: CLINIC | Age: 57
Setting detail: THERAPIES SERIES
Discharge: HOME | End: 2022-09-02
Attending: ORTHOPAEDIC SURGERY
Payer: OTHER MISCELLANEOUS

## 2022-09-02 PROCEDURE — 97110 THERAPEUTIC EXERCISES: CPT | Mod: GP | Performed by: PHYSICAL THERAPIST

## 2022-09-06 ENCOUNTER — HOSPITAL ENCOUNTER (OUTPATIENT)
Dept: PHYSICAL THERAPY | Facility: CLINIC | Age: 57
Setting detail: THERAPIES SERIES
Discharge: HOME | End: 2022-09-06
Attending: ORTHOPAEDIC SURGERY
Payer: OTHER MISCELLANEOUS

## 2022-09-06 PROCEDURE — 97110 THERAPEUTIC EXERCISES: CPT | Mod: GP | Performed by: PHYSICAL THERAPIST

## 2022-09-06 NOTE — PROGRESS NOTES
[facility]    Outpatient Physical Therapy Progress Note  Patient: Leslie Turner  : 1965    Beginning/End Dates of Reporting Period:  22 to 10/13/22    Referring Provider: Dr. Baldo Poole    Therapy Diagnosis: s/p R TKA     Client Self Report: She notes a decrease in her Sx. She had an active weekend with shopping and various other family activities at this time.    Objective Measurements:  Objective Measure: right knee AAROM  Details: 0 - 0 - 128 post tx  Objective Measure: Gait  Details: min limp without cane       Goals:  Goal Identifier STG   Goal Description Patient to demonstrate 0 - 115* right knee AROM to improve functional mobility in community.   Target Date 22   Date Met  22   Progress (detail required for progress note):       Goal Identifier STG   Goal Description Patient to demonstrate min to no antalgic gait pattern without use of assistive device.   Target Date 22   Date Met  22   Progress (detail required for progress note):       Goal Identifier LTG   Goal Description Patient to exhibit full AROM of right knee 0 - 130* to improve ability to navigate stairs and any outdoor surface.   Target Date 10/13/22   Date Met      Progress (detail required for progress note): Not Met- working towards her full mobility at this time. Close to the number with her mobility.     Goal Identifier LTG   Goal Description Patient to report walking over 1 mile without right knee pain.   Target Date 10/13/22   Date Met      Progress (detail required for progress note): Not Met- the pt is walking short distances at this time without an AD but unable to ambulate far       Plan:  Continue therapy per current plan of care.    Discharge:  No

## 2022-09-08 ENCOUNTER — HOSPITAL ENCOUNTER (OUTPATIENT)
Dept: PHYSICAL THERAPY | Facility: CLINIC | Age: 57
Setting detail: THERAPIES SERIES
Discharge: HOME | End: 2022-09-08
Attending: ORTHOPAEDIC SURGERY
Payer: OTHER MISCELLANEOUS

## 2022-09-08 PROCEDURE — 97110 THERAPEUTIC EXERCISES: CPT | Mod: GP | Performed by: PHYSICAL THERAPIST

## 2022-09-13 ENCOUNTER — HOSPITAL ENCOUNTER (OUTPATIENT)
Dept: PHYSICAL THERAPY | Facility: CLINIC | Age: 57
Setting detail: THERAPIES SERIES
Discharge: HOME | End: 2022-09-13
Attending: ORTHOPAEDIC SURGERY
Payer: OTHER MISCELLANEOUS

## 2022-09-13 PROCEDURE — 97110 THERAPEUTIC EXERCISES: CPT | Mod: GP | Performed by: PHYSICAL THERAPIST

## 2022-09-15 ENCOUNTER — HOSPITAL ENCOUNTER (OUTPATIENT)
Dept: PHYSICAL THERAPY | Facility: CLINIC | Age: 57
Setting detail: THERAPIES SERIES
Discharge: HOME | End: 2022-09-15
Attending: ORTHOPAEDIC SURGERY
Payer: OTHER MISCELLANEOUS

## 2022-09-15 PROCEDURE — 97110 THERAPEUTIC EXERCISES: CPT | Mod: GP | Performed by: PHYSICAL THERAPIST

## 2022-09-15 NOTE — PROGRESS NOTES
PHYSICAL THERAPY DISCHARGE NOTE  09/15/22 0900   Signing Clinician's Name / Credentials   Signing clinician's name / credentials Antonieta Zamudio, PT, DPT, OCS   Session Number   Session Number 13   Authorization status WC pending   Progress Note/Recertification   Progress Note Completed Date 09/06/22   Adult Goals   PT Ortho Eval Goals 1;2;3;4   Ortho Goal 1   Goal Identifier STG   Goal Description Patient to demonstrate 0 - 115* right knee AROM to improve functional mobility in community.   Target Date 09/08/22   Date Met 09/06/22   Ortho Goal 2   Goal Identifier STG   Goal Description Patient to demonstrate min to no antalgic gait pattern without use of assistive device.   Target Date 09/08/22   Date Met 09/06/22   Ortho Goal 3   Goal Identifier LTG   Goal Description Patient to exhibit full AROM of right knee 0 - 130* to improve ability to navigate stairs and any outdoor surface.   Target Date 10/13/22   Date Met 09/13/22   Ortho Goal 4   Goal Identifier LTG   Goal Description Patient to report walking over 1 mile without right knee pain.   Goal Progress progressing, able to walk increasing distances   Target Date 10/13/22   Subjective Report   Subjective Report On feet a lot over the weekend, was sore yesterday but better today. Sees Dr. Poole right after PT today.   Objective Measures   Objective Measures Objective Measure 1;Objective Measure 2   Objective Measure 1   Objective Measure right knee AAROM   Details 0 - 0 - 130 post tx   Objective Measure 2   Objective Measure Gait   Details min limp without cane   Treatment Interventions   Interventions Therapeutic Procedure/Exercise;Manual Therapy   Therapeutic Procedure/exercise   Therapeutic Procedures: strength, endurance, ROM, flexibillity minutes (14058) 30   Skilled Intervention ROM/light strengthening   Patient Response much improved strength and ROM, 1 episode knee giving way with standing ex   Treatment Detail Scitfit seat 6, L4 x 5 min; Exercise  Name: Supine Heel Slides, Sets: 2 - Reps: 10 - Sessions: 1  Exercise Name: Functional Knee Extension with Tubing, Sets: 2-3 - Reps: 10 - Sessions: 1, Notes: Blue band  Exercise Name: Hip AROM Standing Abduction, Sets: 2-3 - Reps: 10 - Sessions: 1, Notes: green band at ankles  Exercise Name: Hip AROM Standing Extension, Sets: 2-3 - Reps: 10 - Sessions: 1, Notes: green band band at ankles  Exercise Name: Marching in Place - Reps: 10 on each side - Sessions: 1, Notes: Hold each march for 3 seconds  Exercise Name: Step Up/Down Lateral - Right Foot, Sets: 2-3 - Reps: 10 - Sessions: 1  Exercise Name: Stepdown Forward, Sets: 2-3 - Reps: 10 - Sessions: 1  Exercise Name: Hip Flexion Straight Leg Raise, Sets: 1-3 - Reps: 10 - Sessions: 2  Exercise Name: Hip Abduction Straight Leg Raise, Sets: 1 - Reps: 20 - Sessions: 1   Education   Learner Patient   Readiness Refuses   Method Booklet/handout   Response Verbalizes Understanding   Plan   Homework ptrx   Home program see snapshot   Updates to plan of care most goals met, pt independent with HEP, discharge   Plan for next session cont HEP on own   Total Session Time   Timed Code Treatment Minutes 30   Total Treatment Time (sum of timed and untimed services) 30   AMBULATORY CLINICS ONLY-MEDICAL AND TREATMENT DIAGNOSIS   Medical Diagnosis s/p R TKA   PT Diagnosis s/p R TKA       Please contact me with any questions or concerns.  Thank you for your referral.    Antonieta Zamudio, PT, DPT, OCS  Physical Therapist, Orthopedic Certified Specialist    [facility]  [address] [place] [address]  [email or web address]   Office: [phone]   Employed by [employer] [employer] [facility]

## 2022-10-26 ENCOUNTER — OFFICE VISIT (OUTPATIENT)
Dept: PODIATRY | Facility: CLINIC | Age: 57
End: 2022-10-26
Payer: COMMERCIAL

## 2022-10-26 VITALS
SYSTOLIC BLOOD PRESSURE: 143 MMHG | HEART RATE: 71 BPM | WEIGHT: 201 LBS | BODY MASS INDEX: 32.3 KG/M2 | HEIGHT: 66 IN | DIASTOLIC BLOOD PRESSURE: 84 MMHG

## 2022-10-26 DIAGNOSIS — B35.1 PAIN DUE TO ONYCHOMYCOSIS OF TOENAILS OF BOTH FEET: Primary | ICD-10-CM

## 2022-10-26 DIAGNOSIS — M79.674 PAIN DUE TO ONYCHOMYCOSIS OF TOENAILS OF BOTH FEET: Primary | ICD-10-CM

## 2022-10-26 DIAGNOSIS — M79.675 PAIN DUE TO ONYCHOMYCOSIS OF TOENAILS OF BOTH FEET: Primary | ICD-10-CM

## 2022-10-26 DIAGNOSIS — L60.0 INGROWN NAIL OF GREAT TOE OF RIGHT FOOT: ICD-10-CM

## 2022-10-26 PROCEDURE — 99213 OFFICE O/P EST LOW 20 MIN: CPT | Mod: 25 | Performed by: PODIATRIST

## 2022-10-26 PROCEDURE — 11750 EXCISION NAIL&NAIL MATRIX: CPT | Mod: T5 | Performed by: PODIATRIST

## 2022-10-26 RX ORDER — CICLOPIROX 80 MG/ML
SOLUTION TOPICAL DAILY
Qty: 6.6 ML | Refills: 1 | Status: SHIPPED | OUTPATIENT
Start: 2022-10-26 | End: 2023-10-19

## 2022-10-26 NOTE — PROGRESS NOTES
Leslie Turner is a 57 year old female who presents with a chief complaint of a painful ingrown toenail to the right great toe.  The patient relates pain when wearing shoes.  The patient denies any redness extending up the big toe into the foot.  The patient relates the condition has been getting worse over the past several weeks.         Pertinent medical, surgical and family history was reviewed in the chart.    Vitals: LMP  (LMP Unknown)   BMI= There is no height or weight on file to calculate BMI.    LOWER EXTREMITY PHYSICAL EXAM    Dermatologic: One notes an inflamed nail border of the right great toe.  There is noted erythema and edema located around the labial fold and does not extend past the interphalangeal joint.  There is no apparent purulent drainage noted.  There is pain on palpation to the proximal aspect of the nail border.  Otherwise, the skin is intact to both lower extremities without significant lesions, rash or abrasion.           Vascular: DP & PT pulses are intact & regular on the right.   CFT and skin temperature is normal to the right lower extremities.     Neurologic: Lower extremity sensation is intact to light touch.  No evidence of weakness in the right lower extremities.        Musculoskeletal: Patient is ambulatory without assistive device or brace.  No gross ankle deformity noted.  No foot or ankle joint effusion is noted.         ASSESSMENT / PLAN:     ICD-10-CM    1. Pain due to onychomycosis of toenails of both feet  B35.1     M79.675     M79.674           Plan:  I have explained to Leslie about the condition.  The potential causes and nature of an ingrown toenail were discussed with the patient.  We reviewed the natural history and prognosis of the condition and potential risks if no treatment is provided.  Treatment options discussed included conservative management (oral antibiotics, soaking of foot, adequate width shoes)  as well as surgical management (partial or total  nail removal).  The pros and cons of both forms as well as risks and benefits of treatment were reviewed.       At this point, I recommended having the offending nail plate removed from the right great toe.  I have explained to the patient all of the possible risks, benefits, alternatives to the procedure.  The patient consented to the proposed procedure.  The right hallux was swabbed with alcohol.  Next, approximately 3 cc of 1% lidocaine plain was injected around the right hallux.  The right hallux was then prepped with Betadine ointment.  A tourniquet was applied to the right hallux.  A [place] elevator was utilized to free up the eponychium of the offending nail border.  Next, the offending nail plate was avulsed in toto.  The wound was then irrigated and dressed with bacitracin antibiotic ointment and a compressive  sterile dressing.  The tourniquet was removed and a prompt hyperemic response was noted.  The patient tolerated the procedure well with no complications.  The patient was given post procedure instructions for the care of the wound.  The patient was prescribed topical Penlac antifungal solution to be applied to the affected toenails daily for up to 6 to 9 months.  The patient will return in two weeks for reevaluation and was instructed to notify the office if any redness extending past the big toe joint or fever with chills are experienced before then.      There is low risk of morbidity with the procedure.  There was no overlap in work associated with the evaluation/management and the work associated with the procedure.    Leslie verbalized agreement with and understanding of the rational for the diagnosis and treatment plan.  All questions were answered to best of my ability and the patient's satisfaction. The patient was advised to contact the clinic with any questions that may arise after the clinic visit.      Disclaimer: This note consists of symbols derived from keyboarding, dictation and/or  voice recognition software. As a result, there may be errors in the script that have gone undetected. Please consider this when interpreting information found in this chart.      ANNE MARIE Vazquez D.P.M., [email or web address].

## 2022-10-26 NOTE — LETTER
10/26/2022         RE: Leslie Turner  [address] [facility] [id number]        Dear Colleague,    Thank you for referring your patient, Leslie Turner, to the [facility] [place]. Please see a copy of my visit note below.    Leslie Turner is a 57 year old female who presents with a chief complaint of a painful ingrown toenail to the right great toe.  The patient relates pain when wearing shoes.  The patient denies any redness extending up the big toe into the foot.  The patient relates the condition has been getting worse over the past several weeks.         Pertinent medical, surgical and family history was reviewed in the chart.    Vitals: LMP  (LMP Unknown)   BMI= There is no height or weight on file to calculate BMI.    LOWER EXTREMITY PHYSICAL EXAM    Dermatologic: One notes an inflamed nail border of the right great toe.  There is noted erythema and edema located around the labial fold and does not extend past the interphalangeal joint.  There is no apparent purulent drainage noted.  There is pain on palpation to the proximal aspect of the nail border.  Otherwise, the skin is intact to both lower extremities without significant lesions, rash or abrasion.           Vascular: DP & PT pulses are intact & regular on the right.   CFT and skin temperature is normal to the right lower extremities.     Neurologic: Lower extremity sensation is intact to light touch.  No evidence of weakness in the right lower extremities.        Musculoskeletal: Patient is ambulatory without assistive device or brace.  No gross ankle deformity noted.  No foot or ankle joint effusion is noted.         ASSESSMENT / PLAN:     ICD-10-CM    1. Pain due to onychomycosis of toenails of both feet  B35.1     M79.675     M79.674           Plan:  I have explained to Leslie about the condition.  The potential causes and nature of an ingrown toenail were discussed with the patient.  We reviewed the  natural history and prognosis of the condition and potential risks if no treatment is provided.  Treatment options discussed included conservative management (oral antibiotics, soaking of foot, adequate width shoes)  as well as surgical management (partial or total nail removal).  The pros and cons of both forms as well as risks and benefits of treatment were reviewed.       At this point, I recommended having the offending nail plate removed from the right great toe.  I have explained to the patient all of the possible risks, benefits, alternatives to the procedure.  The patient consented to the proposed procedure.  The right hallux was swabbed with alcohol.  Next, approximately 3 cc of 1% lidocaine plain was injected around the right hallux.  The right hallux was then prepped with Betadine ointment.  A tourniquet was applied to the right hallux.  A [place] elevator was utilized to free up the eponychium of the offending nail border.  Next, the offending nail plate was avulsed in toto.  The wound was then irrigated and dressed with bacitracin antibiotic ointment and a compressive  sterile dressing.  The tourniquet was removed and a prompt hyperemic response was noted.  The patient tolerated the procedure well with no complications.  The patient was given post procedure instructions for the care of the wound.  The patient was prescribed topical Penlac antifungal solution to be applied to the affected toenails daily for up to 6 to 9 months.  The patient will return in two weeks for reevaluation and was instructed to notify the office if any redness extending past the big toe joint or fever with chills are experienced before then.      There is low risk of morbidity with the procedure.  There was no overlap in work associated with the evaluation/management and the work associated with the procedure.    Leslie verbalized agreement with and understanding of the rational for the diagnosis and treatment plan.  All  questions were answered to best of my ability and the patient's satisfaction. The patient was advised to contact the clinic with any questions that may arise after the clinic visit.      Disclaimer: This note consists of symbols derived from keyboarding, dictation and/or voice recognition software. As a result, there may be errors in the script that have gone undetected. Please consider this when interpreting information found in this chart.      ANNE MARIE Vazquez D.P.M., F.A.C.F.A.S.        Again, thank you for allowing me to participate in the care of your patient.        Sincerely,        Devon Vazquez DPM

## 2022-10-26 NOTE — PATIENT INSTRUCTIONS
Post toenail procedure instructions:    Keep bandages on for the rest of the day; take off this evening.  Soak the foot and toe in warm water with Epsom's salt or Dreft detergent for 20 min.  Clean the toe and the treated area with a soapy wash cloth.  Apply a topical antibiotic (Bacitracin) to the treated area and cover with a Band-Aid  Repeat this morning and night for two weeks, or until the treated are resolves any redness or drainage.  Redness and drainage is normal when treated with the Phenol acid.  Notify the office if there is any redness extending up the foot or purulent drainage noted.    Any discomfort should be treated with either Ibuprofen (Advil) or Tylenol.  Please follow package instructions on amount to be taken.       Flu vaccines are now available at all [facility] clinics and retail pharmacies across the [facility]. Appointments are required for clinic locations. To schedule an appointment online, please log into [email or web address] or create an account if you are a new user. You can also call [phone], or simply walk in at one of the [facility] retail pharmacy locations.      [email or web address] - Login Page

## 2022-10-26 NOTE — NURSING NOTE
"Chief Complaint   Patient presents with     Ingrown Toenail     Bilateral great toenails       Initial BP (!) 143/84   Pulse 71   Ht 1.676 m (5' 6\")   Wt 91.2 kg (201 lb)   LMP  (LMP Unknown)   BMI 32.44 kg/m   Estimated body mass index is 32.44 kg/m  as calculated from the following:    Height as of this encounter: 1.676 m (5' 6\").    Weight as of this encounter: 91.2 kg (201 lb).  Medications and allergies reviewed.      Maria Eugenia JOHNSON MA    "

## 2022-11-19 ENCOUNTER — HEALTH MAINTENANCE LETTER (OUTPATIENT)
Age: 57
End: 2022-11-19

## 2022-12-12 ENCOUNTER — TELEPHONE (OUTPATIENT)
Dept: FAMILY MEDICINE | Facility: CLINIC | Age: 57
End: 2022-12-12

## 2022-12-12 NOTE — TELEPHONE ENCOUNTER
Patient Quality Outreach    Patient is due for the following:   Colon Cancer Screening  Physical Preventive Adult Physical    Next Steps:   Schedule a Adult Preventative    Type of outreach:    Sent WAYN message.      Questions for provider review:    None     Autumn Mccartney, [facility]

## 2023-01-19 ENCOUNTER — OFFICE VISIT (OUTPATIENT)
Dept: PODIATRY | Facility: CLINIC | Age: 58
End: 2023-01-19
Payer: COMMERCIAL

## 2023-01-19 VITALS
HEART RATE: 56 BPM | WEIGHT: 201 LBS | SYSTOLIC BLOOD PRESSURE: 145 MMHG | DIASTOLIC BLOOD PRESSURE: 82 MMHG | BODY MASS INDEX: 32.3 KG/M2 | HEIGHT: 66 IN

## 2023-01-19 DIAGNOSIS — M76.821 POSTERIOR TIBIAL TENDINITIS OF BOTH LOWER EXTREMITIES: Primary | ICD-10-CM

## 2023-01-19 DIAGNOSIS — I89.0 LYMPHEDEMA OF BOTH LOWER EXTREMITIES: ICD-10-CM

## 2023-01-19 DIAGNOSIS — M76.822 POSTERIOR TIBIAL TENDINITIS OF BOTH LOWER EXTREMITIES: Primary | ICD-10-CM

## 2023-01-19 DIAGNOSIS — M76.62 TENDONITIS, ACHILLES, LEFT: ICD-10-CM

## 2023-01-19 PROCEDURE — 99213 OFFICE O/P EST LOW 20 MIN: CPT | Performed by: PODIATRIST

## 2023-01-19 RX ORDER — MELOXICAM 7.5 MG/1
7.5 TABLET ORAL DAILY
Qty: 30 TABLET | Refills: 1 | Status: SHIPPED | OUTPATIENT
Start: 2023-01-19 | End: 2023-10-19

## 2023-01-19 ASSESSMENT — PAIN SCALES - GENERAL: PAINLEVEL: EXTREME PAIN (8)

## 2023-01-19 NOTE — NURSING NOTE
"Chief Complaint   Patient presents with     Consult     BL foot pain       Initial BP (!) 145/82   Pulse 56   Ht 1.676 m (5' 6\")   Wt 91.2 kg (201 lb)   LMP  (LMP Unknown)   BMI 32.44 kg/m   Estimated body mass index is 32.44 kg/m  as calculated from the following:    Height as of this encounter: 1.676 m (5' 6\").    Weight as of this encounter: 91.2 kg (201 lb).  Medications and allergies reviewed.      Maria Eugenia JOHNSON MA    "

## 2023-01-19 NOTE — PROGRESS NOTES
PATIENT HISTORY:  Leslie Turner is a 57 year old female who presents to clinic as a self referral with a chief complaint of bilateral foot pain.  The patient is seen by themselves.  The patient relates the pain is primarily located around the ankles down into foot with swelling.  Reports insidious onset without acute precipitating event. that has been going on for 4 week(s).  The patient has previously tried ice packs, heat, foot soaks, tylenol, and NSAIDS with little relief.  Denies any prior history of similar issues..  The patient is currently employed as cook.  Any previous notes and studies that pertain to the patient's condition were reviewed.    Pertinent medical, surgical and family history was reviewed in the [facility] chart.    Past Medical History:   Past Medical History:   Diagnosis Date     Arthritis      Backache, unspecified      Depressive disorder      Depressive disorder, not elsewhere classified      Dysmenorrhea      Esophageal reflux      Irritable bowel syndrome        Medications:   Current Outpatient Medications:      acetaminophen (TYLENOL) 500 MG tablet, Take 500-1,000 mg by mouth every 6 hours as needed for mild pain, Disp: , Rfl:      ciclopirox (PENLAC) 8 % external solution, Apply topically daily Apply to affected nails daily.  Remove the residual build up weekly with nail polish remover or an [place] board., Disp: 6.6 mL, Rfl: 1     diclofenac (VOLTAREN) 1 % topical gel, Apply 4 g topically 4 times daily, Disp: 350 g, Rfl: 1     ibuprofen (ADVIL/MOTRIN) 800 MG tablet, Take 800 mg by mouth every 8 hours as needed for moderate pain (Patient not taking: Reported on 1/24/2023), Disp: , Rfl:      meloxicam (MOBIC) 7.5 MG tablet, Take 1 tablet (7.5 mg) by mouth daily, Disp: 30 tablet, Rfl: 1     sulfamethoxazole-trimethoprim (BACTRIM DS) 800-160 MG tablet, Take 1 tablet by mouth 2 times daily for 5 days, Disp: 10 tablet, Rfl: 0     Allergies:    Allergies   Allergen Reactions     Codeine  "Nausea and Vomiting     Dizziness, and fever       Vitals: BP (!) 145/82   Pulse 56   Ht 1.676 m (5' 6\")   Wt 91.2 kg (201 lb)   LMP  (LMP Unknown)   BMI 32.44 kg/m    BMI= Body mass index is 32.44 kg/m .    LOWER EXTREMITY PHYSICAL EXAM    Dermatologic: Skin is intact to right and left lower extremity without significant lesions, rash or abrasion.        Vascular: DP & PT pulses are intact & regular on the right and left.   CFT and skin temperature is normal to the right and left lower extremity.  Noted bilateral lower extremity lymphedema     Neurologic: Lower extremity sensation is intact to light touch.  No evidence of weakness in the right and left lower extremity.        Musculoskeletal: Patient is ambulatory without assistive device or brace.  No gross ankle deformity noted.  No foot or ankle joint effusion is noted.  Noted pain on palpation over the posterior tibial tendon bilaterally.  Noted pain on palpation over the distal Achilles tendon on the left.  Noted collapse of the medial longitudinal arch with forefoot abduction bilaterally upon weightbearing exam.  Noted tight gastroc complex bilaterally.         ASSESSMENT / PLAN:     ICD-10-CM    1. Posterior tibial tendinitis of both lower extremities  M76.821 meloxicam (MOBIC) 7.5 MG tablet    M76.822 Ankle/Foot Bracing Supplies DME Ankle Brace; Left, Right     Orthotics and Prosthetics DME Orthotic; Foot Orthotics (functional rigid support); Bilateral      2. Tendonitis, Achilles, left  M76.62 meloxicam (MOBIC) 7.5 MG tablet     Ankle/Foot Bracing Supplies DME Ankle Brace; Left, Right     Orthotics and Prosthetics DME Orthotic; Foot Orthotics (functional rigid support); Bilateral      3. Lymphedema of both lower extremities  I89.0 Compression Sleeve/Stocking Order          I have explained to Leslie about the conditions.  We discussed the underlying contributing factors to the condition as well as treatment options along with expected length of " recovery.  At this time, the patient was educated on the importance of offloading supportive shoes and other devices.  I demonstrated to the patient calf stretches to perform every hour daily until symptoms resolve.  After symptoms resolve, the patient was advised to perform the stretches 3 times daily to prevent future recurrence.  The patient was instructed to perform warm soaks with Epson salt after which to also apply over-the-counter Voltaren gel to deeply massage the injured tissue.  The patient was instructed to do this on a daily basis until symptoms resolve.   The patient was fitted with a Tri-Lock ankle brace that will aid in offloading the tension forces to the Achilles tendon on the left.  The patient was referred to [facility] for custom orthotics that will aid in offloading the tension forces to the soft tissues and prevent further inflammation.  The patient was prescribed meloxicam 7.5 mg taken daily with food to help reduce inflammation.  The patient was also prescribed compression stockings to help reduce the amount of edema to the lower extremities.  The patient may return in four weeks for reevaluation to determine if any further treatment will be needed.      Leslie verbalized agreement with and understanding of the rational for the diagnosis and treatment plan.  All questions were answered to best of my ability and the patient's satisfaction. The patient was advised to contact the clinic with any questions that may arise after the clinic visit.      Disclaimer: This note consists of symbols derived from keyboarding, dictation and/or voice recognition software. As a result, there may be errors in the script that have gone undetected. Please consider this when interpreting information found in this chart.       ANNE MARIE Vazquez D.P.M., FYVONNE.F.A.S.

## 2023-01-19 NOTE — PATIENT INSTRUCTIONS

## 2023-01-23 ENCOUNTER — TELEPHONE (OUTPATIENT)
Dept: FAMILY MEDICINE | Facility: CLINIC | Age: 58
End: 2023-01-23
Payer: COMMERCIAL

## 2023-01-23 NOTE — TELEPHONE ENCOUNTER
Reason for call:    Symptom or request:     Patient called stating that she is having vaginal bleeding however she has had a partial hysterectomy. Bleeding started last week.       Best Time:  any    Can we leave a detailed message on this number?  YES     Margo RODRIGUEZ  Station

## 2023-01-23 NOTE — TELEPHONE ENCOUNTER
"Spoke with patient who reports vaginal bleeding x 1 wk patient reports having a partial hysterectomy 20 years ago \"I have never had an vaginal bleeding before\". Patient states initially it was approximately a 1/2 tsp amount 2-3 x/d but now is soaking her undergarments during the NOC. Describes as bright red blood in color, denies any vaginal pain, pelvic pain or difficulty urinating.     Patient unable to come into the clinic today, requesting visit tomorrow scheduled with Dr. Yee 1/24/23 at 2:30 pm.    Julie Behrendt RN    "

## 2023-01-24 ENCOUNTER — OFFICE VISIT (OUTPATIENT)
Dept: FAMILY MEDICINE | Facility: CLINIC | Age: 58
End: 2023-01-24
Payer: COMMERCIAL

## 2023-01-24 VITALS
HEIGHT: 65 IN | OXYGEN SATURATION: 97 % | SYSTOLIC BLOOD PRESSURE: 138 MMHG | WEIGHT: 201 LBS | TEMPERATURE: 98.6 F | RESPIRATION RATE: 18 BRPM | HEART RATE: 51 BPM | BODY MASS INDEX: 33.49 KG/M2 | DIASTOLIC BLOOD PRESSURE: 72 MMHG

## 2023-01-24 DIAGNOSIS — N93.9 VAGINAL BLEEDING: Primary | ICD-10-CM

## 2023-01-24 DIAGNOSIS — N30.01 ACUTE CYSTITIS WITH HEMATURIA: ICD-10-CM

## 2023-01-24 LAB
ALBUMIN UR-MCNC: NEGATIVE MG/DL
APPEARANCE UR: CLEAR
BACTERIA #/AREA URNS HPF: ABNORMAL /HPF
BILIRUB UR QL STRIP: NEGATIVE
CLUE CELLS: ABNORMAL
COLOR UR AUTO: YELLOW
GLUCOSE UR STRIP-MCNC: NEGATIVE MG/DL
HGB UR QL STRIP: NEGATIVE
KETONES UR STRIP-MCNC: NEGATIVE MG/DL
LEUKOCYTE ESTERASE UR QL STRIP: ABNORMAL
NITRATE UR QL: NEGATIVE
PH UR STRIP: 5.5 [PH] (ref 5–7)
RBC #/AREA URNS AUTO: ABNORMAL /HPF
SP GR UR STRIP: 1.02 (ref 1–1.03)
SQUAMOUS #/AREA URNS AUTO: ABNORMAL /LPF
TRICHOMONAS, WET PREP: ABNORMAL
UROBILINOGEN UR STRIP-ACNC: 0.2 E.U./DL
WBC #/AREA URNS AUTO: ABNORMAL /HPF
WBC'S/HIGH POWER FIELD, WET PREP: ABNORMAL
YEAST, WET PREP: ABNORMAL

## 2023-01-24 PROCEDURE — 87210 SMEAR WET MOUNT SALINE/INK: CPT | Performed by: FAMILY MEDICINE

## 2023-01-24 PROCEDURE — 90471 IMMUNIZATION ADMIN: CPT | Performed by: FAMILY MEDICINE

## 2023-01-24 PROCEDURE — 90682 RIV4 VACC RECOMBINANT DNA IM: CPT | Performed by: FAMILY MEDICINE

## 2023-01-24 PROCEDURE — 81001 URINALYSIS AUTO W/SCOPE: CPT | Performed by: FAMILY MEDICINE

## 2023-01-24 PROCEDURE — 99214 OFFICE O/P EST MOD 30 MIN: CPT | Mod: 25 | Performed by: FAMILY MEDICINE

## 2023-01-24 RX ORDER — SULFAMETHOXAZOLE/TRIMETHOPRIM 800-160 MG
1 TABLET ORAL 2 TIMES DAILY
Qty: 10 TABLET | Refills: 0 | Status: SHIPPED | OUTPATIENT
Start: 2023-01-24 | End: 2023-01-29

## 2023-01-24 ASSESSMENT — PAIN SCALES - GENERAL: PAINLEVEL: MILD PAIN (2)

## 2023-01-24 NOTE — PROGRESS NOTES
Assessment & Plan     Vaginal bleeding  -- Etiology unknown at this time. History of hysterectomy without removal of ovaries.   -- Will obtain Pelvic US for further evaluation of ovaries due to discomfort on the right with palpation and bimanual.   -- Refer to OB for further evaluation and next steps.   - Wet prep - Clinic Collect  - UA macro with reflex to Microscopic and Culture - Clinc Collect  - Urine Microscopic    Acute cystitis with hematuria  UA with small leukocyte esterase, and WBC in urine.   Symptomatic with dysuria. Treat with Bactrim x 5 days.       Follow up with OB.       Mat Yee DO  [facility] BONILLA Nelson is a 57 year old, presenting for the following health issues:  Vaginal Bleeding      History of Present Illness       Reason for visit:  Vaginal bleeding  Symptom onset:  1-2 weeks ago  Symptoms include:  Bleeding from vaginal area  Symptom intensity:  Moderate  Symptom progression:  Worsening  Had these symptoms before:  No  What makes it worse:  No  What makes it better:  No    She eats 2-3 servings of fruits and vegetables daily.She consumes 1 sweetened beverage(s) daily.She exercises with enough effort to increase her heart rate 60 or more minutes per day.  She exercises with enough effort to increase her heart rate 4 days per week.   She is taking medications regularly.     57 year old female who presents to clinic for evaluation of vaginal bleeding.     A couple weeks ago had some blood on tissue paper with wiping after voiding.  Initially thought bleeding was from hemorrhoids.   Had bleeding from the vagina a couple days ago.   Bright red blood about a tablespoon worth.   No blood clots.   Has discomfort in the lower abdomen region.   No fevers.   Burning with urination started this morning.     History of partial hysterectomy (took uterus, left ovaries) Sept 2004. Had a bladder sling placed at that time.     No current hormone therapy.     Review  "of Systems         Objective    /72 (BP Location: Left arm, Patient Position: Chair, Cuff Size: Adult Large)   Pulse 51   Temp 98.6  F (37  C) (Tympanic)   Resp 18   Ht 1.651 m (5' 5\")   Wt 91.2 kg (201 lb)   LMP  (LMP Unknown)   SpO2 97%   BMI 33.45 kg/m    Body mass index is 33.45 kg/m .  Physical Exam   General: alert, cooperative, no acute distress   CV: RRR, no murmur  Resp: non-labored breathing, clear to auscultation, no wheezing or rales   Abdomen: Soft, mild tenderness in lower quadrant bilaterally.   PELVIC:  Normal appearing external female genitalia, at entry of vagina erythematous skin changes, no active bleeding. no abnormal discharge. Absent cervix. Bimanual exam with mild discomfort on patient's right.         "

## 2023-01-24 NOTE — PATIENT INSTRUCTIONS
Schedule Vaginal US.     Follow up with OB GYN.     Start on Bactrim twice daily for 5 days for UTI.

## 2023-02-01 ENCOUNTER — HOSPITAL ENCOUNTER (OUTPATIENT)
Dept: ULTRASOUND IMAGING | Facility: CLINIC | Age: 58
Discharge: HOME | End: 2023-02-01
Attending: FAMILY MEDICINE | Admitting: FAMILY MEDICINE
Payer: COMMERCIAL

## 2023-02-01 DIAGNOSIS — N93.9 VAGINAL BLEEDING: ICD-10-CM

## 2023-02-01 PROCEDURE — 76856 US EXAM PELVIC COMPLETE: CPT

## 2023-02-13 ENCOUNTER — OFFICE VISIT (OUTPATIENT)
Dept: OBGYN | Facility: CLINIC | Age: 58
End: 2023-02-13
Payer: COMMERCIAL

## 2023-02-13 VITALS
HEART RATE: 60 BPM | BODY MASS INDEX: 33.91 KG/M2 | OXYGEN SATURATION: 97 % | SYSTOLIC BLOOD PRESSURE: 131 MMHG | DIASTOLIC BLOOD PRESSURE: 77 MMHG | WEIGHT: 203.8 LBS

## 2023-02-13 DIAGNOSIS — R10.2 PELVIC PAIN IN FEMALE: ICD-10-CM

## 2023-02-13 DIAGNOSIS — N95.2 ATROPHIC VAGINITIS: ICD-10-CM

## 2023-02-13 DIAGNOSIS — N94.10 DYSPAREUNIA, FEMALE: ICD-10-CM

## 2023-02-13 DIAGNOSIS — N93.9 VAGINAL BLEEDING: Primary | ICD-10-CM

## 2023-02-13 LAB
CLUE CELLS: ABNORMAL
TRICHOMONAS, WET PREP: ABNORMAL
WBC'S/HIGH POWER FIELD, WET PREP: ABNORMAL
YEAST, WET PREP: ABNORMAL

## 2023-02-13 PROCEDURE — 99204 OFFICE O/P NEW MOD 45 MIN: CPT | Performed by: OBSTETRICS & GYNECOLOGY

## 2023-02-13 PROCEDURE — 87210 SMEAR WET MOUNT SALINE/INK: CPT | Performed by: OBSTETRICS & GYNECOLOGY

## 2023-02-13 RX ORDER — GLYCERIN/PROPYLENE GLYCOL/WATR
SOLUTION, NON-ORAL VAGINAL DAILY PRN
Qty: 66.5 G | Refills: 3 | Status: SHIPPED | OUTPATIENT
Start: 2023-02-13

## 2023-02-13 RX ORDER — ESTRADIOL 0.1 MG/G
2 CREAM VAGINAL
Qty: 42.5 G | Refills: 1 | Status: SHIPPED | OUTPATIENT
Start: 2023-02-13

## 2023-02-13 NOTE — PATIENT INSTRUCTIONS
To Schedule an Appointment 24/7  Call: [phone]    If you have any questions regarding your visit, Please contact your care team.  [facility]: [phone]  [facility] HOURS TELEPHONE NUMBER   Cephas Agbeh, M.D.      Libertad Denis-Surgery Scheduler  [facility]-Surgery Scheduler         Monday - [place]:    8:00 am-4:45 pm  Tuesday - [place]:   8:00 am-4:45 pm  Friday-[place]:       8:00 am-4:45 pm  Typical Surgery Day:  Wednesday [facility]   [address]   [place] [address] [phone]   Fax [phone]    Imaging Scheduling all locations  [phone]      [facility] Labor and Delivery   [address] [place] [address] Dr.   [place], [place] [address] [phone]    [facility]  [address] [facility] [address]  [phone]  Fax [phone]     Urgent Care locations:  [facility] Monday-Friday                               10 am - 8 pm  Saturday and Sunday                      9 am - 5 pm  Monday-Friday                              10 am- 8 pm  Saturday and Sunday                      9 am - 5 pm    [phone]   **Surgeries** Our Surgery Schedulers will contact you to schedule. If you do not receive a call within 3 business days, please call [phone].  If you need a medication refill, please contact your pharmacy. Please allow 3 business days for your refill to be completed.  As always, Thank you for trusting us with your healthcare needs!  see additional instructions from your care team below

## 2023-02-13 NOTE — PROGRESS NOTES
Leslie is a 58 year old  referred here byMat Yee DO  Family Medicine  for consultation regarding vagina lbleeding and pelvic pain..    Patient was evaluated by her primary care provider on 2023.  At that time she had had 3 episodes of vaginal bleeding.  The first episode was a small amount of spotting on tissue paper.  The second time she saw some blood in her underwear.  And the third time was for sexual intercourse with her spouse.  She since has not had any more bleeding or spotting.    She is status post hysterectomy over 10 years ago with retention of both ovaries.  She has never been on any form of hormonal therapy.  She says she has vaginal dryness with sexual intercourse.  They have no use any lubricants.    In regards to her pelvic pain which tends to be mostly on the right lower quadrant side.  An ultrasound performed was normal as below.  The right ovary was normal..  Patient says she has a long history of irritable bowel syndrome and diverticulitis.    Study Result    Narrative & Impression   EXAM: US PELVIC TRANSABDOMINAL AND TRANSVAGINAL  LOCATION: [facility]  DATE/TIME: 2023 4:50 PM    INDICATION: Vaginal bleeding.  COMPARISON: None.  TECHNIQUE: Transabdominal scans were performed. Endovaginal ultrasound was performed to better visualize the adnexa.     FINDINGS:     UTERUS: Uterus is surgically absent.      RIGHT OVARY: 3.4 x 1.6 x 1.7 cm. Normal.     LEFT OVARY: Left ovary not visualized due to overlying bowel gas.     No significant free fluid.                                                                      IMPRESSION:  1.  PO hysterectomy.     2.  Normal right ovary.     3.  Left ovary not visualized due to overlying bowel gas.     4.  No adnexal masses or complex cysts are visualized. No free fluid in the pelvis.       ROS: Ten point review of systems was reviewed and negative except the above.    Gyne: - abn pap (last pap ), -  STD's    Past Medical History:   Diagnosis Date     Arthritis      Backache, unspecified      Depressive disorder      Depressive disorder, not elsewhere classified      Dysmenorrhea      Esophageal reflux      Irritable bowel syndrome      Past Surgical History:   Procedure Laterality Date     COLONOSCOPY  2018     HERNIA REPAIR  1967     HYSTERECTOMY, PAP NO LONGER INDICATED       SOFT TISSUE SURGERY  1/2022    Rotator cuff     SURGICAL HISTORY OF -       HEMORRHOIDECTOMY     SURGICAL HISTORY OF -       ANAL FISTULA, SPHINCTEROTOMY     SURGICAL HISTORY OF -   2005    CARPAL TUNNEL RELEASE     SURGICAL HISTORY OF -       HERNIA     SURGICAL HISTORY OF -   1995    BTL     Patient Active Problem List   Diagnosis     Depressive disorder, not elsewhere classified     Esophageal reflux     CTS (carpal tunnel syndrome)     History of right knee surgery     Irritable bowel syndrome with constipation       ALL/Meds: Her medication and allergy histories were reviewed and are documented in their appropriate chart areas.    SH: - tob, - EtOH,     FH: Her family history was reviewed and documented in its appropriate chart area.    PE: /77 (BP Location: Right arm, Patient Position: Sitting, Cuff Size: Adult Regular)   Pulse 60   Wt 92.4 kg (203 lb 12.8 oz)   LMP  (LMP Unknown)   SpO2 97%   BMI 33.91 kg/m    Body mass index is 33.91 kg/m .    General Appearance:  healthy, alert, active, no distress  HEENT: NCAT  Abdomen: Soft, nontender.  Normal bowel sounds.  No masses  Pelvic:       - Ext: NEFG,        - Urethra: no lesions, no masses, or hypermobility       - Urethral Meatus: normal appearance,        - Bladder: no tenderness, no masses       - Vagina: pale and dry, normal rugae, dry lesions, no discharge             - Adnexa: no masses, no tenderness       - Rectal: deferred,       - Pelvic support: no cystocele, no rectocele,   Results for orders placed or performed in visit on 02/13/23   Wet prep - Clinic  Collect     Status: Abnormal    Specimen: Vagina; Swab   Result Value Ref Range    Trichomonas Absent Absent    Yeast Absent Absent    Clue Cells Absent Absent    WBCs/high power field 4+ (A) None     NURSE PRESENT FOR EXAM.  A/P  I discussed with her that, symptoms are more consistent with atrophic vaginitis.  With the aid of the ACOG pamphlet we discussed that lack of estrogen could contribute to this.  I recommended topical estrogen in the vagina and lubricants for sexual intercourse.    Total time preparing to see patient with reviewing prior encounter and labs, face to face time,  and coordinating care on the same calendar date: 45 mins.  CEPHAS AGBEH, MD.

## 2023-02-16 ENCOUNTER — OFFICE VISIT (OUTPATIENT)
Dept: PODIATRY | Facility: CLINIC | Age: 58
End: 2023-02-16
Payer: COMMERCIAL

## 2023-02-16 ENCOUNTER — ANCILLARY PROCEDURE (OUTPATIENT)
Dept: GENERAL RADIOLOGY | Facility: CLINIC | Age: 58
End: 2023-02-16
Attending: PODIATRIST
Payer: COMMERCIAL

## 2023-02-16 VITALS
WEIGHT: 203 LBS | HEIGHT: 65 IN | SYSTOLIC BLOOD PRESSURE: 128 MMHG | HEART RATE: 65 BPM | DIASTOLIC BLOOD PRESSURE: 75 MMHG | BODY MASS INDEX: 33.82 KG/M2

## 2023-02-16 DIAGNOSIS — M76.821 POSTERIOR TIBIAL TENDINITIS OF BOTH LOWER EXTREMITIES: Primary | ICD-10-CM

## 2023-02-16 DIAGNOSIS — M20.42 ACQUIRED BILATERAL HAMMER TOES: ICD-10-CM

## 2023-02-16 DIAGNOSIS — M20.41 ACQUIRED BILATERAL HAMMER TOES: ICD-10-CM

## 2023-02-16 DIAGNOSIS — M20.11 HALLUX VALGUS, RIGHT: ICD-10-CM

## 2023-02-16 DIAGNOSIS — M76.822 POSTERIOR TIBIAL TENDINITIS OF BOTH LOWER EXTREMITIES: Primary | ICD-10-CM

## 2023-02-16 PROCEDURE — 99213 OFFICE O/P EST LOW 20 MIN: CPT | Performed by: PODIATRIST

## 2023-02-16 PROCEDURE — 73630 X-RAY EXAM OF FOOT: CPT | Mod: TC | Performed by: RADIOLOGY

## 2023-02-16 ASSESSMENT — PAIN SCALES - GENERAL: PAINLEVEL: MILD PAIN (2)

## 2023-02-16 NOTE — PROGRESS NOTES
"Leslie returns to the office for reevaluation of the right and left foot.  The patient relates following the instructions given at the last visit with noted overall more pain and less improvement in function of the right and left foot.   The patient relates no other problems.    Vitals: /75   Pulse 65   Ht 1.651 m (5' 5\")   Wt 92.1 kg (203 lb)   LMP  (LMP Unknown)   BMI 33.78 kg/m    BMI= Body mass index is 33.78 kg/m .    Lower Extremity Physical Exam:      Neurovascular status remains unchanged.  Muscular exam is within normal limits to major muscle groups.  Integument is intact.      Noted collapse of medial longitudinal arch with forefoot abduction noted upon weightbearing exam.  Noted pain on palpation of the posterior tibial tendon bilaterally.  Noted bunion deformity on the right foot.  Noted bilateral lesser toe hammertoe deformities.    Diagnostics:  Radiograph evaluation including three views of the right and left foot reveals a moderate bunion deformity on the right with an increased intermetatarsal angle and hallux abductus angle.  Noted lesser toe hammertoe deformities bilaterally.  I personally evaluated the images as well as reviewed the images with the patient pointing out the findings.      Assessment:     ICD-10-CM    1. Posterior tibial tendinitis of both lower extremities  M76.821 XR Foot Bilateral G/E 3 Views    M76.822 Orthotics and Prosthetics DME Orthotic; Foot Orthotics (functional rigid support); Bilateral      2. Hallux valgus, right  M20.11 Orthotics and Prosthetics DME Orthotic; Foot Orthotics (functional rigid support); Bilateral      3. Acquired bilateral hammer toes  M20.41 Orthotics and Prosthetics DME Orthotic; Foot Orthotics (functional rigid support); Bilateral    M20.42           Plan:    I have explained to Leslie about the progress of the conditions.  At this time, the patient was educated on the importance of offloading supportive shoes and other devices.  I " demonstrated to the patient calf stretches to perform every hour daily until symptoms resolve.  After symptoms resolve, the patient was advised to perform the stretches 3 times daily to prevent future recurrence.  The patient was instructed to perform warm soaks with Epson salt after which to also apply over-the-counter Voltaren gel to deeply massage the injured tissue.  The patient was instructed to do this on a daily basis until symptoms resolve.   The patient was prescribed custom orthotics that will aid in offloading the tension forces to the soft tissues and prevent further inflammation.   The patient may return in four weeks for reevaluation to determine if any further treatment will be needed.      Leslie verbalized agreement with and understanding of the rational for the diagnosis and treatment plan.  All questions were answered to best of my ability and the patient's satisfaction. The patient was advised to contact the clinic with any questions that may arise after the clinic visit.      Disclaimer: This note consists of symbols derived from keyboarding, dictation and/or voice recognition software. As a result, there may be errors in the script that have gone undetected. Please consider this when interpreting information found in this chart.       ANNE MARIE Vazquez D.P.M., FYVONNE.F.A.S.

## 2023-02-16 NOTE — PATIENT INSTRUCTIONS
Next Steps:      Support:  Wear supportive shoes, sandals, boots and/or inserts that have a rigid supportive sole.    This will offload the majority of tension forces that travel through your feet every step you take.    Skechers Max Cushioning Elite/Premier   Skechers Relax Fit D'Lux Walker  Reebok Walk Ultra 7 DMX MAX   Hoka Bondi walking shoes  Swipe.to shoes/slippers (https://[employer])  Retrace sandals (https://www.[employer]Me[employer]/us)  Superfeet inserts (www.superfeet.com)  It is important that you also wear supportive shoe wear in the house to continue providing support to your feet.    You may always use a cushioned liner for your shoes if that makes your feet feel better.  Stretching  Calf stretching is essential to offload the tension forces that travel through your feet every step you take  Preferred calf stretch is the Runner's Stretch  Place one foot behind the other foot, flat against the ground (it is important to keep the heel on the ground).  The back leg is the one that will be stretched.  Start with the knee straight and lean your hips into the wall, counter or whatever you are leaning into - count to ten.  Next, bend the knee.  You should feel the stretch lower in the calf muscle - count to ten.  Repeat this stretch once an hour to start off with.  When symptoms subside, I recommend performing the stretch 3 times daily to prevent any future problems.                Tissue Massage  It is important that you physically loosen the inflammation tissue to help your body heal the injured tissue.  I recommend soaking your foot in warm water to increase the microcirculation to the soft tissues.  You may add Epson salt to the water if you prefer.  You may apply an over-the-counter muscle rub, such as Voltaren gel, and deeply massage the injured tissue.  Reduce Inflammation  You can ice the injured tissue with an ice pack with a light cloth covering or soaking in ice water 20 minutes to reduce  any acute inflammation, typically at the end of the day.      It is important to understand that most problems that develop in the foot and ankle are caused by excessive tension that cause microinjury to the soft tissues and inflammation in the foot and ankle.  By addressing the underlying causes with support and stretching as well as treating the current inflammatory conditions with tissue massage and anti-inflammatory treatments, most foot and ankle musculoskeletal conditions will resolve.  This may take time to heal.  However, if symptoms persist past 4 weeks you should return to the office for reevaluation to determine further treatment options.      Dr. Pires's Gel Toe Cap  Size: Mini  Quantity: two boxes (4 caps)    [facility]  [address] [place] [id number]  Contact  Phone:  [phone]    [facility]  [address] [place] [address]  Contact  Phone:  [phone]    [facility]  [address] [facility] [place] [address]  Contact  Phone:  [phone]

## 2023-02-16 NOTE — NURSING NOTE
"Chief Complaint   Patient presents with     RECHECK     BL feet- brace's caused arch pain- second or third digit burning sensation. Feels like a rubber band is wrapping around toes       Initial /75   Pulse 65   Ht 1.651 m (5' 5\")   Wt 92.1 kg (203 lb)   LMP  (LMP Unknown)   BMI 33.78 kg/m   Estimated body mass index is 33.78 kg/m  as calculated from the following:    Height as of this encounter: 1.651 m (5' 5\").    Weight as of this encounter: 92.1 kg (203 lb).  Medications and allergies reviewed.      Maria Eugenia JOHNSON MA    "

## 2023-02-16 NOTE — LETTER
"    2/16/2023         RE: Leslie Turner  [address] [facility] [address]        Dear Colleague,    Thank you for referring your patient, Leslie Turner, to the [facility]. Please see a copy of my visit note below.    Leslie returns to the office for reevaluation of the right and left foot.  The patient relates following the instructions given at the last visit with noted overall more pain and less improvement in function of the right and left foot.   The patient relates no other problems.    Vitals: /75   Pulse 65   Ht 1.651 m (5' 5\")   Wt 92.1 kg (203 lb)   LMP  (LMP Unknown)   BMI 33.78 kg/m    BMI= Body mass index is 33.78 kg/m .    Lower Extremity Physical Exam:      Neurovascular status remains unchanged.  Muscular exam is within normal limits to major muscle groups.  Integument is intact.      Noted collapse of medial longitudinal arch with forefoot abduction noted upon weightbearing exam.  Noted pain on palpation of the posterior tibial tendon bilaterally.  Noted bunion deformity on the right foot.  Noted bilateral lesser toe hammertoe deformities.    Diagnostics:  Radiograph evaluation including three views of the right and left foot reveals a moderate bunion deformity on the right with an increased intermetatarsal angle and hallux abductus angle.  Noted lesser toe hammertoe deformities bilaterally.  I personally evaluated the images as well as reviewed the images with the patient pointing out the findings.      Assessment:     ICD-10-CM    1. Posterior tibial tendinitis of both lower extremities  M76.821 XR Foot Bilateral G/E 3 Views    M76.822 Orthotics and Prosthetics DME Orthotic; Foot Orthotics (functional rigid support); Bilateral      2. Hallux valgus, right  M20.11 Orthotics and Prosthetics DME Orthotic; Foot Orthotics (functional rigid support); Bilateral      3. Acquired bilateral hammer toes  M20.41 Orthotics and Prosthetics DME Orthotic; " Foot Orthotics (functional rigid support); Bilateral    M20.42           Plan:    I have explained to Leslie about the progress of the conditions.  At this time, the patient was educated on the importance of offloading supportive shoes and other devices.  I demonstrated to the patient calf stretches to perform every hour daily until symptoms resolve.  After symptoms resolve, the patient was advised to perform the stretches 3 times daily to prevent future recurrence.  The patient was instructed to perform warm soaks with Epson salt after which to also apply over-the-counter Voltaren gel to deeply massage the injured tissue.  The patient was instructed to do this on a daily basis until symptoms resolve.   The patient was prescribed custom orthotics that will aid in offloading the tension forces to the soft tissues and prevent further inflammation.   The patient may return in four weeks for reevaluation to determine if any further treatment will be needed.      Leslie verbalized agreement with and understanding of the rational for the diagnosis and treatment plan.  All questions were answered to best of my ability and the patient's satisfaction. The patient was advised to contact the clinic with any questions that may arise after the clinic visit.      Disclaimer: This note consists of symbols derived from keyboarding, dictation and/or voice recognition software. As a result, there may be errors in the script that have gone undetected. Please consider this when interpreting information found in this chart.       ALEX Espino.P.BENNIE., F.A.C.F.A.S.        Again, thank you for allowing me to participate in the care of your patient.        Sincerely,        Devon Vazquez DPM

## 2023-04-18 ENCOUNTER — TELEPHONE (OUTPATIENT)
Dept: FAMILY MEDICINE | Facility: CLINIC | Age: 58
End: 2023-04-18
Payer: COMMERCIAL

## 2023-04-18 NOTE — TELEPHONE ENCOUNTER
Patient Quality Outreach    Patient is due for the following:   Physical Preventive Adult Physical    Next Steps:   Schedule a Adult Preventative    Type of outreach:    Sent [email or web address] message.      Questions for provider review:    None     Autumn Mccartney, CMA

## 2023-06-01 ENCOUNTER — HEALTH MAINTENANCE LETTER (OUTPATIENT)
Age: 58
End: 2023-06-01

## 2023-06-28 ENCOUNTER — TELEPHONE (OUTPATIENT)
Dept: FAMILY MEDICINE | Facility: CLINIC | Age: 58
End: 2023-06-28
Payer: COMMERCIAL

## 2023-06-28 NOTE — TELEPHONE ENCOUNTER
New Medication Request    Contacts       Type Contact Phone/Fax    06/28/2023 01:03 PM CDT Phone (Incoming) Leslie Turner (Self) [phone] (H)        What medication are you requesting?: Albuterol Inhaler    Reason for medication request: Pt states that the smoke in the air is bothering her lungs, so she wants Nakia Yung to prescribe an albuterol inhaler for her.  Pt declines an appt or UC.  No SOB or difficulty breathing.   Please call patient and advise.      Have you taken this medication before?: Yes: Old MD prescribed years ago for pt     Controlled Substance Agreement on file:   CSA -- Patient Level:    CSA: None found at the patient level.       Patient offered an appointment? Yes: Pt declined and declined UC    Preferred Pharmacy:   [facility] [address] [address]  Phone: [phone] Fax: [phone] Alternate Fax: [phone], [phone]      Could we [facility] this information to you [facility] or would you prefer to receive a phone call?:   Patient would prefer a phone call   Okay to leave a detailed message?: Yes at Cell number on file:    Telephone Information:   Mobile [phone]

## 2023-06-29 NOTE — TELEPHONE ENCOUNTER
S-(situation): the patient did have inhaler when she lived in [place], due to air quality here she would like an inhaler.    B-(background): the patient does not have asthma.     A-(assessment): the patient has been in doors most of the time. When the patient does go outside she has some SOA when talking and does feel chest tightness when outside and then when she comes back in she will feel better after a little bit.      R-(recommendations): the patient was advised to be seen in person. The patient would like message sent to provider to review.    Thank you    Peyton LARES RN

## 2023-06-30 NOTE — TELEPHONE ENCOUNTER
Patient notified, declines urgent care, states she's better today with the  air.  She would still like to get in as soon as able to discuss with provider and get an inhaler if able.  RN notes PCP is out on vacation next week, so assisted with scheduling same-day appt with another available provider.   RN did advise patient to seek urgent/emergency care as needed for worsening symptoms or new symptoms like chest pain.  Understanding voiced.    Naomie Castellanos RN  [facility]

## 2023-06-30 NOTE — TELEPHONE ENCOUNTER
Patient should be evaluated for this to ensure nothing more serious is going on. Needs office appt.

## 2023-10-19 ENCOUNTER — OFFICE VISIT (OUTPATIENT)
Dept: FAMILY MEDICINE | Facility: CLINIC | Age: 58
End: 2023-10-19
Payer: COMMERCIAL

## 2023-10-19 ENCOUNTER — ANCILLARY PROCEDURE (OUTPATIENT)
Dept: GENERAL RADIOLOGY | Facility: CLINIC | Age: 58
End: 2023-10-19
Attending: NURSE PRACTITIONER
Payer: COMMERCIAL

## 2023-10-19 ENCOUNTER — HOSPITAL ENCOUNTER (OUTPATIENT)
Dept: RESPIRATORY THERAPY | Facility: CLINIC | Age: 58
Discharge: HOME | End: 2023-10-19
Attending: NURSE PRACTITIONER | Admitting: NURSE PRACTITIONER
Payer: COMMERCIAL

## 2023-10-19 VITALS
WEIGHT: 199.4 LBS | OXYGEN SATURATION: 97 % | HEART RATE: 50 BPM | SYSTOLIC BLOOD PRESSURE: 128 MMHG | DIASTOLIC BLOOD PRESSURE: 80 MMHG | BODY MASS INDEX: 33.22 KG/M2 | TEMPERATURE: 98.2 F | RESPIRATION RATE: 16 BRPM | HEIGHT: 65 IN

## 2023-10-19 DIAGNOSIS — R07.89 CHEST HEAVINESS: ICD-10-CM

## 2023-10-19 DIAGNOSIS — R06.02 SOB (SHORTNESS OF BREATH): ICD-10-CM

## 2023-10-19 DIAGNOSIS — R07.89 CHEST HEAVINESS: Primary | ICD-10-CM

## 2023-10-19 PROCEDURE — 94726 PLETHYSMOGRAPHY LUNG VOLUMES: CPT

## 2023-10-19 PROCEDURE — 94729 DIFFUSING CAPACITY: CPT

## 2023-10-19 PROCEDURE — 94375 RESPIRATORY FLOW VOLUME LOOP: CPT

## 2023-10-19 PROCEDURE — 93000 ELECTROCARDIOGRAM COMPLETE: CPT | Performed by: NURSE PRACTITIONER

## 2023-10-19 PROCEDURE — 71046 X-RAY EXAM CHEST 2 VIEWS: CPT | Mod: TC | Performed by: RADIOLOGY

## 2023-10-19 PROCEDURE — 99214 OFFICE O/P EST MOD 30 MIN: CPT | Mod: 25 | Performed by: NURSE PRACTITIONER

## 2023-10-19 RX ORDER — ALBUTEROL SULFATE 90 UG/1
2 AEROSOL, METERED RESPIRATORY (INHALATION) EVERY 6 HOURS PRN
Qty: 18 G | Refills: 1 | Status: SHIPPED | OUTPATIENT
Start: 2023-10-19

## 2023-10-19 ASSESSMENT — PAIN SCALES - GENERAL: PAINLEVEL: NO PAIN (0)

## 2023-10-19 NOTE — PATIENT INSTRUCTIONS
Your EKG shows slow heart rate otherwise normal EKG.  Your Chest x-ray looks normal.  I will let you know if radiology see's something I do not.  Schedule PFT's to do further evaluation of lung function.  Please call [phone] to schedule your pulmonary function testing.  Use Albuterol inhaler as needed every 4 hours.  5.  Follow-up if any worsening symptoms until PFT's completed.

## 2023-10-19 NOTE — PROGRESS NOTES
Assessment & Plan     (R07.89) Chest heaviness  (primary encounter diagnosis)  Comment: Chest Xray, EKG and PFT ordered to rule out any underlining heart or lungs condition.  EKG shows bradycardia otherwise normal and chest xray look normal, patient will be notified if radiology report shows something different. Pending PFT and will follow up with result.  Ordered albuterol to use as needed for shortness of breath to see if this improves symptoms.  Plan: XR Chest 2 Views, EKG 12-lead complete w/read -        Clinics, General PFT Lab (Please always keep         checked), Pulmonary Function Test          (R06.02) SOB (shortness of breath)  Comment: See note above  Plan: XR Chest 2 Views, EKG 12-lead complete w/read -        Clinics, General PFT Lab (Please always keep         checked), Pulmonary Function Test, albuterol         (PROAIR HFA/PROVENTIL HFA/VENTOLIN HFA) 108 (90        Base) MCG/ACT inhaler          Elizabeth Ornelas, NP Student    I, Peyton Cook, was present with the NP student who participated in the service and in the documentationof the note.   I have verified the history and personally performed the physical exam and medical decision making.  I agree with the assessment and plan of care as documented in the note.     Peyton Cook NP on 10/19/2023 at 12:55 PM    Peyton Cook NP  [facility] 58 year [facility], presenting for the following health issues:  breathing issues        10/19/2023    10:58 AM   Additional Questions   Roomed by rmb   Accompanied by self         10/19/2023    10:58 AM   Patient Reported Additional Medications   Patient reports taking the following new medications none       History of Present Illness       Reason for visit:  Shortness of breath at times  Symptom onset:  More than a month  Symptoms include:  Trouble at times takingdeep breath when around smoke and some other smells  Symptom intensity:  Moderate  Symptom  "progression:  Staying the same  Had these symptoms before:  Yes  Has tried/received treatment for these symptoms:  No  What makes it worse:  Smoke smell, diesel smell,many cleaning products    She eats 2-3 servings of fruits and vegetables daily.She consumes 0 sweetened beverage(s) daily.She exercises with enough effort to increase her heart rate 10 to 19 minutes per day.  She exercises with enough effort to increase her heart rate 3 or less days per week.   She is taking medications regularly.       Review of Systems   CONSTITUTIONAL: NEGATIVE for fever, chills, change in weight  INTEGUMENTARY/SKIN: NEGATIVE for worrisome rashes, moles or lesions  EYES: NEGATIVE for vision changes or irritation  ENT/MOUTH: NEGATIVE for ear, mouth and throat problems  RESP: NEGATIVE for significant cough or SOB  BREAST: NEGATIVE for masses, tenderness or discharge  CV: NEGATIVE for chest pain, palpitations or peripheral edema  GI: NEGATIVE for nausea, abdominal pain, heartburn, or change in bowel habits  : NEGATIVE for frequency, dysuria, or hematuria  MUSCULOSKELETAL: NEGATIVE for significant arthralgias or myalgia  NEURO: NEGATIVE for weakness, dizziness or paresthesias  ENDOCRINE: NEGATIVE for temperature intolerance, skin/hair changes  HEME: NEGATIVE for bleeding problems  PSYCHIATRIC: NEGATIVE for changes in mood or affect      Objective    /80   Pulse 50   Temp 98.2  F (36.8  C) (Tympanic)   Resp 16   Ht 1.658 m (5' 5.28\")   Wt 90.4 kg (199 lb 6.4 oz)   LMP  (LMP Unknown)   SpO2 97%   BMI 32.90 kg/m    Body mass index is 32.9 kg/m .  Physical Exam   GENERAL: healthy, alert and no distress  EYES: Eyes grossly normal to inspection, PERRL and conjunctivae and sclerae normal  HENT: ear canals and TM's normal, nose and mouth without ulcers or lesions  NECK: no adenopathy, no asymmetry, masses, or scars and thyroid normal to palpation  RESP: lungs clear to auscultation - no rales, rhonchi or wheezes  BREAST: normal " without masses, tenderness or nipple discharge and no palpable axillary masses or adenopathy  CV: regular rate and rhythm, normal S1 S2, no S3 or S4, no murmur, click or rub, no peripheral edema and peripheral pulses strong  ABDOMEN: soft, nontender, no hepatosplenomegaly, no masses and bowel sounds normal  MS: no gross musculoskeletal defects noted, no edema  SKIN: no suspicious lesions or rashes  NEURO: Normal strength and tone, mentation intact and speech normal  PSYCH: mentation appears normal, affect normal/bright    CXR - Reviewed and interpreted by me Normal- no infiltrates, effusions, pneumothoraces, cardiomegaly or masses  EKG - Reviewed and interpreted by me sinus bradycardia, normal axis, normal intervals, no acute ST/T changes c/w ischemia, no LVH by voltage criteria, unchanged from previous tracings

## 2023-10-20 LAB
DLCOUNC-%PRED-PRE: 126 %
DLCOUNC-PRE: 25.95 ML/MIN/MMHG
DLCOUNC-PRED: 20.54 ML/MIN/MMHG
ERV-%PRED-PRE: 52 %
ERV-PRE: 0.61 L
ERV-PRED: 1.16 L
EXPTIME-PRE: 6.33 SEC
FEF2575-%PRED-PRE: 119 %
FEF2575-PRE: 2.81 L/SEC
FEF2575-PRED: 2.34 L/SEC
FEFMAX-%PRED-PRE: 118 %
FEFMAX-PRE: 7.81 L/SEC
FEFMAX-PRED: 6.62 L/SEC
FEV1-%PRED-PRE: 119 %
FEV1-PRE: 2.98 L
FEV1FEV6-PRE: 81 %
FEV1FEV6-PRED: 81 %
FEV1FVC-PRE: 80 %
FEV1FVC-PRED: 80 %
FEV1SVC-PRE: 81 %
FEV1SVC-PRED: 73 %
FIFMAX-PRE: 3.98 L/SEC
FRCPLETH-%PRED-PRE: 89 %
FRCPLETH-PRE: 2.46 L
FRCPLETH-PRED: 2.75 L
FVC-%PRED-PRE: 119 %
FVC-PRE: 3.73 L
FVC-PRED: 3.11 L
IC-%PRED-PRE: 128 %
IC-PRE: 2.99 L
IC-PRED: 2.32 L
RVPLETH-%PRED-PRE: 92 %
RVPLETH-PRE: 1.76 L
RVPLETH-PRED: 1.9 L
TLCPLETH-%PRED-PRE: 106 %
TLCPLETH-PRE: 5.46 L
TLCPLETH-PRED: 5.11 L
VA-%PRED-PRE: 114 %
VA-PRE: 5.6 L
VC-%PRED-PRE: 108 %
VC-PRE: 3.7 L
VC-PRED: 3.39 L

## 2024-03-17 ENCOUNTER — APPOINTMENT (OUTPATIENT)
Dept: GENERAL RADIOLOGY | Facility: CLINIC | Age: 59
End: 2024-03-17
Attending: FAMILY MEDICINE
Payer: COMMERCIAL

## 2024-03-17 ENCOUNTER — HOSPITAL ENCOUNTER (EMERGENCY)
Facility: CLINIC | Age: 59
Discharge: HOME | End: 2024-03-17
Attending: FAMILY MEDICINE | Admitting: FAMILY MEDICINE
Payer: COMMERCIAL

## 2024-03-17 VITALS
DIASTOLIC BLOOD PRESSURE: 80 MMHG | TEMPERATURE: 98.3 F | HEART RATE: 54 BPM | OXYGEN SATURATION: 99 % | RESPIRATION RATE: 20 BRPM | SYSTOLIC BLOOD PRESSURE: 157 MMHG

## 2024-03-17 DIAGNOSIS — S60.221A CONTUSION OF RIGHT HAND, INITIAL ENCOUNTER: ICD-10-CM

## 2024-03-17 DIAGNOSIS — S60.022A CONTUSION OF LEFT INDEX FINGER WITHOUT DAMAGE TO NAIL, INITIAL ENCOUNTER: ICD-10-CM

## 2024-03-17 PROCEDURE — 99213 OFFICE O/P EST LOW 20 MIN: CPT | Performed by: FAMILY MEDICINE

## 2024-03-17 PROCEDURE — G0463 HOSPITAL OUTPT CLINIC VISIT: HCPCS | Mod: 25 | Performed by: FAMILY MEDICINE

## 2024-03-17 PROCEDURE — 73130 X-RAY EXAM OF HAND: CPT | Mod: 50

## 2024-03-17 ASSESSMENT — COLUMBIA-SUICIDE SEVERITY RATING SCALE - C-SSRS
6. HAVE YOU EVER DONE ANYTHING, STARTED TO DO ANYTHING, OR PREPARED TO DO ANYTHING TO END YOUR LIFE?: NO
2. HAVE YOU ACTUALLY HAD ANY THOUGHTS OF KILLING YOURSELF IN THE PAST MONTH?: NO
1. IN THE PAST MONTH, HAVE YOU WISHED YOU WERE DEAD OR WISHED YOU COULD GO TO SLEEP AND NOT WAKE UP?: NO

## 2024-03-17 ASSESSMENT — ACTIVITIES OF DAILY LIVING (ADL): ADLS_ACUITY_SCORE: 35

## 2024-03-17 NOTE — ED PROVIDER NOTES
History     Chief Complaint   Patient presents with    Hand Pain     Left hand injury , smashed left hand while putting dock in the water today      HPI  Leslie Turner is a 59 year old female who had some hand injuries while working on a dock today.    Specifically, the dock surface fell down upon her hands while she had them in the same area.    She complains of pain and swelling and bruising especially over the index finger of the left hand and has some less noticeable pain in the thenar region of the right thumb.    She has some abrasions over the left index.  They do not require sutures.  Her tetanus shot is current.      Allergies:  Allergies   Allergen Reactions    Morphine And Related Nausea and Vomiting     Dizziness, and fever       Problem List:    Patient Active Problem List    Diagnosis Date Noted    Irritable bowel syndrome with constipation 02/01/2018     Priority: Medium    Esophageal reflux 05/11/2007     Priority: Medium    CTS (carpal tunnel syndrome) 06/26/2004     Priority: Medium    History of right knee surgery 01/21/2003     Priority: Medium        Past Medical History:    Past Medical History:   Diagnosis Date    Arthritis     Backache, unspecified     Depressive disorder     Depressive disorder, not elsewhere classified     Dysmenorrhea     Esophageal reflux     Irritable bowel syndrome        Past Surgical History:    Past Surgical History:   Procedure Laterality Date    COLONOSCOPY  2018    HERNIA REPAIR  1967    HYSTERECTOMY, PAP NO LONGER INDICATED      SOFT TISSUE SURGERY  1/2022    Rotator cuff    SURGICAL HISTORY OF -       HEMORRHOIDECTOMY    SURGICAL HISTORY OF -       ANAL FISTULA, SPHINCTEROTOMY    SURGICAL HISTORY OF -   2005    CARPAL TUNNEL RELEASE    SURGICAL HISTORY OF -       HERNIA    SURGICAL HISTORY OF -   1995    BTL       Family History:    Family History   Problem Relation Age of Onset    Diabetes Mother     Arthritis Mother     Kidney Disease Mother      Diabetes Brother     Cancer Maternal Grandmother     Alzheimer Disease Maternal Grandmother        Social History:  Marital Status:   [2]  Social History     Tobacco Use    Smoking status: Former     Packs/day: 0.00     Years: 1.00     Additional pack years: 0.00     Total pack years: 0.00     Types: Cigarettes    Smokeless tobacco: Never    Tobacco comments:     Patient quit in 1995,  smokes in car and outside.  stop smoking about 8-9 yrs ago   Vaping Use    Vaping Use: Never used   Substance Use Topics    Alcohol use: Not Currently    Drug use: No        Medications:    acetaminophen (TYLENOL) 500 MG tablet  albuterol (PROAIR HFA/PROVENTIL HFA/VENTOLIN HFA) 108 (90 Base) MCG/ACT inhaler  diclofenac (VOLTAREN) 1 % topical gel  estradiol (ESTRACE) 0.1 MG/GM vaginal cream  ibuprofen (ADVIL/MOTRIN) 800 MG tablet  Lubricants (ASTROGLIDE) GEL          Review of Systems    Unremarkable except as above.            Physical Exam   BP: (!) 157/80  Pulse: 54  Temp: 98.3  F (36.8  C)  Resp: 20  SpO2: 99 %      Physical Exam    Left hand  Swelling and ecchymosis dorsum of left index and distal left index metacarpal region.  Superficial abrasions.    Right hand  Minimally tender over thumb metacarpal.        ED Course        Procedures              Critical Care time:  none               Results for orders placed or performed during the hospital encounter of 03/17/24 (from the past 24 hour(s))   XR Hand Left G/E 3 Views    Narrative    EXAM: XR HAND LEFT G/E 3 VIEWS  LOCATION: [facility]  DATE: 3/17/2024    INDICATION: Left hand pain after trauma.  COMPARISON: None.      Impression    IMPRESSION: Left hand negative for fracture or dislocation. Severe degenerative arthritis at the first CMC joint. Mild degenerative arthritic changes in the interphalangeal joints. Mild soft tissue swelling in the second finger. No radiodense foreign   body.   XR Hand Right G/E 3 Views    Narrative     EXAM: XR HAND RIGHT G/E 3 VIEWS  LOCATION: [facility]  DATE: 3/17/2024    INDICATION: Right thumb pain.  COMPARISON: None.      Impression    IMPRESSION: Right hand negative for fracture or dislocation. Severe degenerative arthritis at the first CMC joint.       Medications - No data to display    Assessments & Plan (with Medical Decision Making)     X-rays reveal no acute fractures.  There is underlying arthritis.    Injury is primarily contusion, especially of the left index.    Patient advised to work on range of motion, elevation, ice.  Indications for recheck also discussed.          I have reviewed the nursing notes.    I have reviewed the findings, diagnosis, plan and need for follow up with the patient.           Medical Decision Making  The patient's presentation was of low complexity.    The patient's evaluation involved:  Exam and x-ray.            New Prescriptions    No medications on file       Final diagnoses:   Contusion of left index finger without damage to nail, initial encounter   Contusion of right hand, initial encounter       3/17/2024   [facility] EMERGENCY DEPT       Vishal Lawson MD  03/17/24 [id number]

## 2024-03-17 NOTE — DISCHARGE INSTRUCTIONS
Elevate the hand and apply ice.    Work on range of motion.    Abrasions may be treated with antibiotic ointment and Band-Aids.    If you develop worsening pain and swelling or other concerning symptoms, return for reevaluation.

## 2024-04-06 ENCOUNTER — HEALTH MAINTENANCE LETTER (OUTPATIENT)
Age: 59
End: 2024-04-06

## 2024-05-13 ENCOUNTER — TELEPHONE (OUTPATIENT)
Dept: FAMILY MEDICINE | Facility: CLINIC | Age: 59
End: 2024-05-13
Payer: COMMERCIAL

## 2024-05-13 NOTE — TELEPHONE ENCOUNTER
Patient Quality Outreach    Patient is due for the following:   Breast Cancer Screening - Mammogram  Physical Preventive Adult Physical    Next Steps:   Schedule a Adult Preventative    Type of outreach:    Sent [employer] message.      Questions for provider review:    None           Autumn Mccartney, [facility]

## 2024-05-20 ENCOUNTER — TELEPHONE (OUTPATIENT)
Dept: FAMILY MEDICINE | Facility: CLINIC | Age: 59
End: 2024-05-20
Payer: COMMERCIAL

## 2024-05-20 NOTE — TELEPHONE ENCOUNTER
"Patient calls reporting 2 episodes of past symptoms, nothing currently, so unable to formally triage at this time.     Patient reports that last evening and the evening prior, she had a brief episode where she felt some upper chest/back discomfort, mild tingling in bilateral arms and hands and felt \"foggy\", then burped a lot and felt immediately better.   She denies syncope, confusion, weakness, vision or speech issues, SOB, headaches or other symptoms with this.  No symptoms since these episodes.   She did have cardiac work-up last fall for chest pain and SOB, states this was different.   She requests clinic appointment, but couldn't find anything until July.   PCP has no availability this week, so scheduled for tomorrow with Peyton Cook CNP whom patient last saw (last fall).  Patient was advised to present to ER in the meantime should symptoms return. Understanding voiced.    Naomie Castellanos RN  [facility]    "

## 2024-05-21 ENCOUNTER — OFFICE VISIT (OUTPATIENT)
Dept: FAMILY MEDICINE | Facility: CLINIC | Age: 59
End: 2024-05-21
Payer: COMMERCIAL

## 2024-05-21 VITALS
RESPIRATION RATE: 16 BRPM | DIASTOLIC BLOOD PRESSURE: 78 MMHG | TEMPERATURE: 98.9 F | OXYGEN SATURATION: 97 % | BODY MASS INDEX: 31.82 KG/M2 | WEIGHT: 198 LBS | HEIGHT: 66 IN | SYSTOLIC BLOOD PRESSURE: 132 MMHG | HEART RATE: 67 BPM

## 2024-05-21 DIAGNOSIS — R14.1 FLATULENCE, ERUCTATION AND GAS PAIN: ICD-10-CM

## 2024-05-21 DIAGNOSIS — R07.9 CHEST PAIN, UNSPECIFIED TYPE: Primary | ICD-10-CM

## 2024-05-21 DIAGNOSIS — R14.2 FLATULENCE, ERUCTATION AND GAS PAIN: ICD-10-CM

## 2024-05-21 DIAGNOSIS — B35.1 FUNGAL INFECTION OF TOENAIL: ICD-10-CM

## 2024-05-21 DIAGNOSIS — R14.3 FLATULENCE, ERUCTATION AND GAS PAIN: ICD-10-CM

## 2024-05-21 LAB
ALBUMIN SERPL BCG-MCNC: 4.4 G/DL (ref 3.5–5.2)
ALP SERPL-CCNC: 73 U/L (ref 40–150)
ALT SERPL W P-5'-P-CCNC: 23 U/L (ref 0–50)
AST SERPL W P-5'-P-CCNC: 19 U/L (ref 0–45)
BILIRUB DIRECT SERPL-MCNC: <0.2 MG/DL (ref 0–0.3)
BILIRUB SERPL-MCNC: 0.5 MG/DL
PROT SERPL-MCNC: 6.9 G/DL (ref 6.4–8.3)
TSH SERPL DL<=0.005 MIU/L-ACNC: 0.55 UIU/ML (ref 0.3–4.2)

## 2024-05-21 PROCEDURE — 84443 ASSAY THYROID STIM HORMONE: CPT | Performed by: NURSE PRACTITIONER

## 2024-05-21 PROCEDURE — 80076 HEPATIC FUNCTION PANEL: CPT | Performed by: NURSE PRACTITIONER

## 2024-05-21 PROCEDURE — 36415 COLL VENOUS BLD VENIPUNCTURE: CPT | Performed by: NURSE PRACTITIONER

## 2024-05-21 PROCEDURE — G2211 COMPLEX E/M VISIT ADD ON: HCPCS | Performed by: NURSE PRACTITIONER

## 2024-05-21 PROCEDURE — 93000 ELECTROCARDIOGRAM COMPLETE: CPT | Performed by: NURSE PRACTITIONER

## 2024-05-21 PROCEDURE — 99213 OFFICE O/P EST LOW 20 MIN: CPT | Performed by: NURSE PRACTITIONER

## 2024-05-21 ASSESSMENT — PAIN SCALES - GENERAL: PAINLEVEL: NO PAIN (0)

## 2024-05-21 NOTE — PROGRESS NOTES
Assessment & Plan     Chest pain, unspecified type  EKG shows bradycardia.  Patient presented with normal pulse of 67.  Uncertain if chest pressure is GI or not but will check TSH to make sure that this is not the cause and ECHO ordered to rule out cardiac cause completely.  Discussed possible anxiety after feeling the pain as well.  - EKG 12-lead complete w/read - Clinics  - TSH with free T4 reflex; Future  - Echocardiogram Complete; Future  - TSH with free T4 reflex    Flatulence, eructation and gas pain  Discussed FODMAP diet, beano, and GasX for symptoms.  If not Improving would refer to GI for possible SIBO testing and/or consult.    Fungal infection of toenail  Liver function ordered today.  If normal, I will order 3 months terbinofine treatment with recheck liver function in 6 and 12 weeks for monitoring.  - Hepatic panel (Albumin, ALT, AST, Bili, Alk Phos, TP); Future  - Hepatic panel (Albumin, ALT, AST, Bili, Alk Phos, TP); Standing  - Hepatic panel (Albumin, ALT, AST, Bili, Alk Phos, TP)      See Patient Instructions    Piter Nelson is a 59 year old, presenting for the following health issues:  Chest Pain and Tingling (In bilateral hands )        5/21/2024    11:27 AM   Additional Questions   Roomed by rmb   Accompanied by self         5/21/2024    11:27 AM   Patient Reported Additional Medications   Patient reports taking the following new medications none     History of Present Illness       Reason for visit:  Chest & back pressure  Symptom onset:  1-3 days ago  Symptoms include:  Tightness for a couple minutes,arms & hands tingle then shortly after im able to burp but for 10-15 min straight  Symptom intensity:  Moderate  Symptom progression:  Staying the same  Had these symptoms before:  No    She eats 2-3 servings of fruits and vegetables daily.She consumes 0 sweetened beverage(s) daily.She exercises with enough effort to increase her heart rate 20 to 29 minutes per day.  She exercises with  "enough effort to increase her heart rate 4 days per week.   She is taking medications regularly.     Patient presents today with episodes of chest pressure in the upper chest and through to her back.  With this symptom she had some tingling in her arms.  She states that she did have some burping which helped a little but this occurred 2 nights in a row and then last night was the first night it did not occur.  She states that the symptoms lasted for about 3 minutes then she would also have burping episode for about 10 to 15 minutes.  She is under a lot of increased stress recently and she does notice that she gets anxiety when this occurs.  The only thing she can correlate is that she drink water before hand she denies any other possible correlations.    Patient also has fungal toenails on bilateral feet.  She has been doing topical treatment which has not changed anything over the last several months.  She is requesting oral treatment for them.      Review of Systems  CONSTITUTIONAL: NEGATIVE for fever, chills, change in weight  INTEGUMENTARY/SKIN: POSITIVE for fungal toenails on bilateral feet  RESP: NEGATIVE for significant cough or SOB  CV: POSITIVE for chest pain/chest pressure  GI: POSITIVE for belching episodes but denies heartburn  PSYCHIATRIC: POSITIVE foranxiety  ROS otherwise negative      Objective    /78   Pulse 67   Temp 98.9  F (37.2  C) (Tympanic)   Resp 16   Ht 1.67 m (5' 5.75\")   Wt 89.8 kg (198 lb)   LMP  (LMP Unknown)   SpO2 97%   BMI 32.20 kg/m    Body mass index is 32.2 kg/m .  Physical Exam   GENERAL: alert and no distress  RESP: lungs clear to auscultation - no rales, rhonchi or wheezes  CV: regular rate and rhythm, normal S1 S2, no S3 or S4, no murmur, click or rub, no peripheral edema  ABDOMEN: soft, nontender, no hepatosplenomegaly, no masses and bowel sounds normal  PSYCH: mentation appears normal, affect normal/bright; patient does seem very anxious and concerned about her " health.    EKG - Reviewed and interpreted by me sinus bradycardia, normal axis, normal intervals, no acute ST/T changes c/w ischemia, no LVH by voltage criteria, unchanged from previous tracings      Signed Electronically by: Peyton Cook NP

## 2024-05-21 NOTE — PATIENT INSTRUCTIONS
Use Beano with meals and at bedtime and/or GasX as needed for belching and chest/abdominal pressure or gas.  No drinking out of straws.  Follow FODMAP diet and after a couple weeks if no symptoms, start adding back foods to see if they cause symptoms.  If this continues to be an issue and testing is normal, notify me and I will put in a GI referral for evaluation of SIBO (small intestinal bacterial overgrowth) or other cause for symptoms.

## 2024-05-23 ENCOUNTER — TELEPHONE (OUTPATIENT)
Dept: FAMILY MEDICINE | Facility: CLINIC | Age: 59
End: 2024-05-23
Payer: COMMERCIAL

## 2024-05-23 DIAGNOSIS — B35.1 FUNGAL INFECTION OF TOENAIL: Primary | ICD-10-CM

## 2024-05-23 RX ORDER — TERBINAFINE HYDROCHLORIDE 250 MG/1
250 TABLET ORAL DAILY
Qty: 90 TABLET | Refills: 0 | Status: SHIPPED | OUTPATIENT
Start: 2024-05-23 | End: 2024-08-21

## 2024-05-23 NOTE — TELEPHONE ENCOUNTER
Reason for call:  Other   Patient called regarding (reason for call): call back    Additional comments: patient seen on 5/22 and told she would be getting a prescription for toenail fungus- there is nothing at the pharmacy and she hasn't heard back.   Call to advise    Phone number to reach patient:  Home number on file [phone] (home)    Best Time:  any    Can we leave a detailed message on this number?  YES    Travel screening: Not Applicable

## 2024-05-23 NOTE — TELEPHONE ENCOUNTER
Pt returned a call to clinic; says that she isn't sure what they were calling about, caller left a non-detailed message. Writer unable to determine who was trying to reach pt; did advise that prescription had been sent for terbinafine, and she verbalized understanding. Pt states that it's ok to leave a detailed message if anyone else is still trying to reach her.    Susanne Gray RN  [facility]

## 2024-05-23 NOTE — TELEPHONE ENCOUNTER
"Seen in office 5/21       Fungal infection of toenail  Liver function ordered today.  If normal, I will order 3 months terbinofine treatment with recheck liver function in 6 and 12 weeks for monitoring.  - Hepatic panel (Albumin, ALT, AST, Bili, Alk Phos, TP); Future  - Hepatic panel (Albumin, ALT, AST, Bili, Alk Phos, TP); Standing  - Hepatic panel (Albumin, ALT, AST, Bili, Alk Phos, TP)    Lab Results   Component Value Date    AST 19 05/21/2024     Lab Results   Component Value Date    ALT 23 05/21/2024     No results found for: \"BILICONJ\"   Lab Results   Component Value Date    BILITOTAL 0.5 05/21/2024     Lab Results   Component Value Date    ALBUMIN 4.4 05/21/2024    ALBUMIN 3.4 09/22/2021     Lab Results   Component Value Date    PROTTOTAL 6.9 05/21/2024      Lab Results   Component Value Date    ALKPHOS 73 05/21/2024     Routing to provider for consideration for sending script.    Karely Barrera RN    "

## 2024-05-30 ENCOUNTER — HOSPITAL ENCOUNTER (EMERGENCY)
Facility: CLINIC | Age: 59
Discharge: HOME | End: 2024-05-30
Attending: EMERGENCY MEDICINE | Admitting: EMERGENCY MEDICINE
Payer: COMMERCIAL

## 2024-05-30 ENCOUNTER — APPOINTMENT (OUTPATIENT)
Dept: CT IMAGING | Facility: CLINIC | Age: 59
End: 2024-05-30
Attending: EMERGENCY MEDICINE
Payer: COMMERCIAL

## 2024-05-30 VITALS
HEIGHT: 66 IN | WEIGHT: 193 LBS | TEMPERATURE: 98.3 F | RESPIRATION RATE: 16 BRPM | SYSTOLIC BLOOD PRESSURE: 141 MMHG | HEART RATE: 73 BPM | DIASTOLIC BLOOD PRESSURE: 77 MMHG | BODY MASS INDEX: 31.02 KG/M2 | OXYGEN SATURATION: 98 %

## 2024-05-30 DIAGNOSIS — K57.92 DIVERTICULITIS: ICD-10-CM

## 2024-05-30 LAB
ALBUMIN UR-MCNC: NEGATIVE MG/DL
ANION GAP SERPL CALCULATED.3IONS-SCNC: 11 MMOL/L (ref 7–15)
APPEARANCE UR: CLEAR
BASOPHILS # BLD AUTO: 0 10E3/UL (ref 0–0.2)
BASOPHILS NFR BLD AUTO: 0 %
BILIRUB UR QL STRIP: NEGATIVE
BUN SERPL-MCNC: 9.5 MG/DL (ref 6–20)
CALCIUM SERPL-MCNC: 9.1 MG/DL (ref 8.6–10)
CHLORIDE SERPL-SCNC: 105 MMOL/L (ref 98–107)
COLOR UR AUTO: YELLOW
CREAT SERPL-MCNC: 0.81 MG/DL (ref 0.51–0.95)
DEPRECATED HCO3 PLAS-SCNC: 23 MMOL/L (ref 22–29)
EGFRCR SERPLBLD CKD-EPI 2021: 84 ML/MIN/1.73M2
EOSINOPHIL # BLD AUTO: 0.1 10E3/UL (ref 0–0.7)
EOSINOPHIL NFR BLD AUTO: 1 %
ERYTHROCYTE [DISTWIDTH] IN BLOOD BY AUTOMATED COUNT: 11.9 % (ref 10–15)
GLUCOSE SERPL-MCNC: 108 MG/DL (ref 70–99)
GLUCOSE UR STRIP-MCNC: NEGATIVE MG/DL
HCT VFR BLD AUTO: 41.1 % (ref 35–47)
HGB BLD-MCNC: 14 G/DL (ref 11.7–15.7)
HGB UR QL STRIP: ABNORMAL
IMM GRANULOCYTES # BLD: 0 10E3/UL
IMM GRANULOCYTES NFR BLD: 0 %
KETONES UR STRIP-MCNC: 5 MG/DL
LEUKOCYTE ESTERASE UR QL STRIP: ABNORMAL
LYMPHOCYTES # BLD AUTO: 1.5 10E3/UL (ref 0.8–5.3)
LYMPHOCYTES NFR BLD AUTO: 20 %
MCH RBC QN AUTO: 31.4 PG (ref 26.5–33)
MCHC RBC AUTO-ENTMCNC: 34.1 G/DL (ref 31.5–36.5)
MCV RBC AUTO: 92 FL (ref 78–100)
MONOCYTES # BLD AUTO: 0.5 10E3/UL (ref 0–1.3)
MONOCYTES NFR BLD AUTO: 7 %
MUCOUS THREADS #/AREA URNS LPF: PRESENT /LPF
NEUTROPHILS # BLD AUTO: 5.3 10E3/UL (ref 1.6–8.3)
NEUTROPHILS NFR BLD AUTO: 71 %
NITRATE UR QL: NEGATIVE
NRBC # BLD AUTO: 0 10E3/UL
NRBC BLD AUTO-RTO: 0 /100
PH UR STRIP: 6 [PH] (ref 5–7)
PLATELET # BLD AUTO: 224 10E3/UL (ref 150–450)
POTASSIUM SERPL-SCNC: 4.3 MMOL/L (ref 3.4–5.3)
RBC # BLD AUTO: 4.46 10E6/UL (ref 3.8–5.2)
RBC URINE: 1 /HPF
SODIUM SERPL-SCNC: 139 MMOL/L (ref 135–145)
SP GR UR STRIP: 1.02 (ref 1–1.03)
SQUAMOUS EPITHELIAL: 1 /HPF
UROBILINOGEN UR STRIP-MCNC: NORMAL MG/DL
WBC # BLD AUTO: 7.4 10E3/UL (ref 4–11)
WBC URINE: 3 /HPF

## 2024-05-30 PROCEDURE — 81001 URINALYSIS AUTO W/SCOPE: CPT | Performed by: EMERGENCY MEDICINE

## 2024-05-30 PROCEDURE — 96361 HYDRATE IV INFUSION ADD-ON: CPT

## 2024-05-30 PROCEDURE — 250N000009 HC RX 250: Performed by: EMERGENCY MEDICINE

## 2024-05-30 PROCEDURE — 80048 BASIC METABOLIC PNL TOTAL CA: CPT | Performed by: EMERGENCY MEDICINE

## 2024-05-30 PROCEDURE — 74177 CT ABD & PELVIS W/CONTRAST: CPT

## 2024-05-30 PROCEDURE — 85025 COMPLETE CBC W/AUTO DIFF WBC: CPT | Performed by: EMERGENCY MEDICINE

## 2024-05-30 PROCEDURE — 258N000003 HC RX IP 258 OP 636: Performed by: EMERGENCY MEDICINE

## 2024-05-30 PROCEDURE — 36415 COLL VENOUS BLD VENIPUNCTURE: CPT | Performed by: EMERGENCY MEDICINE

## 2024-05-30 PROCEDURE — 250N000011 HC RX IP 250 OP 636: Mod: JZ | Performed by: EMERGENCY MEDICINE

## 2024-05-30 PROCEDURE — 250N000013 HC RX MED GY IP 250 OP 250 PS 637: Performed by: EMERGENCY MEDICINE

## 2024-05-30 PROCEDURE — 99285 EMERGENCY DEPT VISIT HI MDM: CPT | Mod: 25

## 2024-05-30 PROCEDURE — 96374 THER/PROPH/DIAG INJ IV PUSH: CPT | Mod: 59

## 2024-05-30 PROCEDURE — 250N000011 HC RX IP 250 OP 636: Performed by: EMERGENCY MEDICINE

## 2024-05-30 PROCEDURE — 99284 EMERGENCY DEPT VISIT MOD MDM: CPT | Performed by: EMERGENCY MEDICINE

## 2024-05-30 RX ORDER — IOPAMIDOL 755 MG/ML
94 INJECTION, SOLUTION INTRAVASCULAR ONCE
Status: COMPLETED | OUTPATIENT
Start: 2024-05-30 | End: 2024-05-30

## 2024-05-30 RX ORDER — KETOROLAC TROMETHAMINE 15 MG/ML
15 INJECTION, SOLUTION INTRAMUSCULAR; INTRAVENOUS ONCE
Status: COMPLETED | OUTPATIENT
Start: 2024-05-30 | End: 2024-05-30

## 2024-05-30 RX ORDER — POLYETHYLENE GLYCOL 3350 17 G/17G
34 POWDER, FOR SOLUTION ORAL ONCE
Status: COMPLETED | OUTPATIENT
Start: 2024-05-30 | End: 2024-05-30

## 2024-05-30 RX ORDER — DROPERIDOL 2.5 MG/ML
0.62 INJECTION, SOLUTION INTRAMUSCULAR; INTRAVENOUS ONCE
Status: COMPLETED | OUTPATIENT
Start: 2024-05-30 | End: 2024-05-30

## 2024-05-30 RX ADMIN — SODIUM CHLORIDE 64 ML: 9 INJECTION, SOLUTION INTRAVENOUS at 07:46

## 2024-05-30 RX ADMIN — SODIUM CHLORIDE, POTASSIUM CHLORIDE, SODIUM LACTATE AND CALCIUM CHLORIDE 1000 ML: 600; 310; 30; 20 INJECTION, SOLUTION INTRAVENOUS at 07:34

## 2024-05-30 RX ADMIN — KETOROLAC TROMETHAMINE 15 MG: 15 INJECTION, SOLUTION INTRAMUSCULAR; INTRAVENOUS at 07:35

## 2024-05-30 RX ADMIN — AMOXICILLIN AND CLAVULANATE POTASSIUM 1 TABLET: 875; 125 TABLET, FILM COATED ORAL at 09:32

## 2024-05-30 RX ADMIN — IOPAMIDOL 94 ML: 755 INJECTION, SOLUTION INTRAVENOUS at 07:46

## 2024-05-30 RX ADMIN — POLYETHYLENE GLYCOL 3350 34 G: 17 POWDER, FOR SOLUTION ORAL at 09:32

## 2024-05-30 RX ADMIN — DROPERIDOL 0.62 MG: 2.5 INJECTION, SOLUTION INTRAMUSCULAR; INTRAVENOUS at 07:35

## 2024-05-30 ASSESSMENT — COLUMBIA-SUICIDE SEVERITY RATING SCALE - C-SSRS
1. IN THE PAST MONTH, HAVE YOU WISHED YOU WERE DEAD OR WISHED YOU COULD GO TO SLEEP AND NOT WAKE UP?: NO
6. HAVE YOU EVER DONE ANYTHING, STARTED TO DO ANYTHING, OR PREPARED TO DO ANYTHING TO END YOUR LIFE?: NO
2. HAVE YOU ACTUALLY HAD ANY THOUGHTS OF KILLING YOURSELF IN THE PAST MONTH?: NO

## 2024-05-30 ASSESSMENT — ACTIVITIES OF DAILY LIVING (ADL)
ADLS_ACUITY_SCORE: 35

## 2024-05-30 NOTE — DISCHARGE INSTRUCTIONS
You have diverticulitis. We prescribed antibiotics for this. Please take it twice a day for 10 days.     Please buy some Miralax.  Take a capful of Miralax 5 times a day. This will help your constipation and also your diverticulitis.    Buy some Colace and take it 3 times a day for the same reasons.    You can take 1000 milligrams of Tylenol and 800 milligrams of ibuprofen every 8 hours for pain.  You can stagger these so that you are getting one of them every 4 hours.    I put in a referral to surgery for follow up.    Come back for any fevers.

## 2024-05-30 NOTE — ED PROVIDER NOTES
History     Chief Complaint   Patient presents with    Abdominal Pain     HPI  Leslie Turner is a 59 year old female who comes with abdominal pain.  This been going on for several days.  She was constipated for about 3 days and then a few days ago she drank some milk of magnesia and then she had several liquidy and then gel-like.  None since.  She ate yesterday and the day before though she does states she has some reduced appetite.  No vomiting.  She says the abdominal pain is lower.  She says it also burns when she urinates.  No fevers.  No back pain.  She has a history of IBS.  Her IBS pattern includes constipation.  She is worried she has appendicitis or diverticulitis.  She says she has a history of diverticulosis.  I reviewed several primary care notes and also podiatry notes for the patient.  Per chart review she has a history of acute cystitis with hematuria.    Allergies:  Allergies   Allergen Reactions    Morphine And Codeine Nausea and Vomiting     Dizziness, and fever       Problem List:    Patient Active Problem List    Diagnosis Date Noted    Irritable bowel syndrome with constipation 02/01/2018     Priority: Medium    Esophageal reflux 05/11/2007     Priority: Medium    CTS (carpal tunnel syndrome) 06/26/2004     Priority: Medium    History of right knee surgery 01/21/2003     Priority: Medium        Past Medical History:    Past Medical History:   Diagnosis Date    Arthritis     Backache, unspecified     Depressive disorder     Depressive disorder, not elsewhere classified     Dysmenorrhea     Esophageal reflux     Irritable bowel syndrome        Past Surgical History:    Past Surgical History:   Procedure Laterality Date    COLONOSCOPY  2018    HERNIA REPAIR  1967    HYSTERECTOMY, PAP NO LONGER INDICATED      SOFT TISSUE SURGERY  1/2022    Rotator cuff    SURGICAL HISTORY OF -       HEMORRHOIDECTOMY    SURGICAL HISTORY OF -       ANAL FISTULA, SPHINCTEROTOMY    SURGICAL HISTORY OF -   2005     "CARPAL TUNNEL RELEASE    SURGICAL HISTORY OF -       HERNIA    SURGICAL HISTORY OF -   1995    BTL       Family History:    Family History   Problem Relation Age of Onset    Diabetes Mother     Arthritis Mother     Kidney Disease Mother     Diabetes Brother     Cancer Maternal Grandmother     Alzheimer Disease Maternal Grandmother        Social History:  Marital Status:   [2]  Social History     Tobacco Use    Smoking status: Former     Current packs/day: 0.00     Types: Cigarettes    Smokeless tobacco: Never    Tobacco comments:     Patient quit in 1995,  smokes in car and outside.  stop smoking about 8-9 yrs ago   Vaping Use    Vaping status: Never Used   Substance Use Topics    Alcohol use: Not Currently    Drug use: No        Medications:    amoxicillin-clavulanate (AUGMENTIN) 875-125 MG tablet  acetaminophen (TYLENOL) 500 MG tablet  albuterol (PROAIR HFA/PROVENTIL HFA/VENTOLIN HFA) 108 (90 Base) MCG/ACT inhaler  diclofenac (VOLTAREN) 1 % topical gel  estradiol (ESTRACE) 0.1 MG/GM vaginal cream  ibuprofen (ADVIL/MOTRIN) 800 MG tablet  Lubricants (ASTROGLIDE) GEL  terbinafine (LAMISIL) 250 MG tablet          Review of Systems    Physical Exam   BP: (!) 141/77  Pulse: 73  Temp: 98.3  F (36.8  C)  Resp: 16  Height: 167.6 cm (5' 6\")  Weight: 87.5 kg (193 lb)  SpO2: 98 %      Physical Exam  Vitals reviewed.   HENT:      Right Ear: External ear normal.      Left Ear: External ear normal.   Cardiovascular:      Rate and Rhythm: Normal rate.   Pulmonary:      Effort: No respiratory distress.   Abdominal:      Comments: No guarding, no rebound tenderness, some suprapubic tenderness with deep palpation, no peritoneal signs, no masses, no hepatosplenomegaly.   Skin:     Capillary Refill: Capillary refill takes less than 2 seconds.      Coloration: Skin is not jaundiced.      Comments: No rashes or vesicles   Neurological:      General: No focal deficit present.      Cranial Nerves: No cranial nerve " deficit.         ED Course     ED Course as of 05/30/24 1035   Thu May 30, 2024   0708 No wbc elevation.   0811 I independently interpreted the CT and patient may have diverticulitis.    0811 Electrolyte reassuring.    0839 CT read as:                                                                       IMPRESSION: Acute sigmoid diverticulitis with a possible tiny wall  abscess that is not amenable to percutaneous drainage.      Spoke with surgery, Dr. Mason doubts this is an actual abscess. I asked if the patient should be monitored in the hospital but he doesn't think it is necessary. Will likely discharge. Recommends Miralax and Augmentin, outpatient surgery follow up.    1000 Giving the patient some Miralax.    1021 Some LE on UA. Augmentin should cover most bacteria. Will defer adding abx until culture results.    1032 The patient would like to go home.  I gave her ER precautions and she expressed understanding.   I recommended Miralax and Colace.  She is agreeable to these. I rx'd augmentin to her preferred pharmacy. Put in outpatient referral for surgery follow up. Feeling well. Declined narcotic rx. Wants to use tylenol/motrin.    1032 She feels safe with discharge and is expressing hunger. I will discharge her at her request at this time.      Procedures                Results for orders placed or performed during the hospital encounter of 05/30/24 (from the past 24 hour(s))   CBC with platelets, differential    Narrative    The following orders were created for panel order CBC with platelets, differential.  Procedure                               Abnormality         Status                     ---------                               -----------         ------                     CBC with platelets and d...[[id number]]                      Final result                 Please view results for these tests on the individual orders.   Basic metabolic panel   Result Value Ref Range    Sodium 139 135 - 145  mmol/L    Potassium 4.3 3.4 - 5.3 mmol/L    Chloride 105 98 - 107 mmol/L    Carbon Dioxide (CO2) 23 22 - 29 mmol/L    Anion Gap 11 7 - 15 mmol/L    Urea Nitrogen 9.5 6.0 - 20.0 mg/dL    Creatinine 0.81 0.51 - 0.95 mg/dL    GFR Estimate 84 >60 mL/min/1.73m2    Calcium 9.1 8.6 - 10.0 mg/dL    Glucose 108 (H) 70 - 99 mg/dL   UA Macroscopic with reflex to Microscopic and Culture    Specimen: Urine, Clean Catch   Result Value Ref Range    Color Urine Yellow Colorless, Straw, Light Yellow, Yellow    Appearance Urine Clear Clear    Glucose Urine Negative Negative mg/dL    Bilirubin Urine Negative Negative    Ketones Urine 5 (A) Negative mg/dL    Specific Gravity Urine 1.020 1.003 - 1.035    Blood Urine Small (A) Negative    pH Urine 6.0 5.0 - 7.0    Protein Albumin Urine Negative Negative mg/dL    Urobilinogen Urine Normal Normal, 2.0 mg/dL    Nitrite Urine Negative Negative    Leukocyte Esterase Urine Trace (A) Negative    Mucus Urine Present (A) None Seen /LPF    RBC Urine 1 <=2 /HPF    WBC Urine 3 <=5 /HPF    Squamous Epithelials Urine 1 <=1 /HPF    Narrative    Urine Culture not indicated   CBC with platelets and differential   Result Value Ref Range    WBC Count 7.4 4.0 - 11.0 10e3/uL    RBC Count 4.46 3.80 - 5.20 10e6/uL    Hemoglobin 14.0 11.7 - 15.7 g/dL    Hematocrit 41.1 35.0 - 47.0 %    MCV 92 78 - 100 fL    MCH 31.4 26.5 - 33.0 pg    MCHC 34.1 31.5 - 36.5 g/dL    RDW 11.9 10.0 - 15.0 %    Platelet Count 224 150 - 450 10e3/uL    % Neutrophils 71 %    % Lymphocytes 20 %    % Monocytes 7 %    % Eosinophils 1 %    % Basophils 0 %    % Immature Granulocytes 0 %    NRBCs per 100 WBC 0 <1 /100    Absolute Neutrophils 5.3 1.6 - 8.3 10e3/uL    Absolute Lymphocytes 1.5 0.8 - 5.3 10e3/uL    Absolute Monocytes 0.5 0.0 - 1.3 10e3/uL    Absolute Eosinophils 0.1 0.0 - 0.7 10e3/uL    Absolute Basophils 0.0 0.0 - 0.2 10e3/uL    Absolute Immature Granulocytes 0.0 <=0.4 10e3/uL    Absolute NRBCs 0.0 10e3/uL   CT Abdomen Pelvis w  Contrast    Narrative    CT ABDOMEN AND PELVIS WITH CONTRAST 5/30/2024 7:57 AM    CLINICAL HISTORY: Abdominal pain. History of diverticulosis/itis, pain  is lower, several days of it.    TECHNIQUE: CT scan of the abdomen and pelvis was performed following  injection of IV contrast. Multiplanar reformats were obtained. Dose  reduction techniques were used.  CONTRAST: 94 mL Isovue-370    COMPARISON: None.    FINDINGS:   LOWER CHEST: No infiltrates or effusions.    HEPATOBILIARY: Diffuse hepatic steatosis. No significant mass. No bile  duct dilatation. No calcified gallstones.    PANCREAS: No significant mass, duct dilatation, or inflammatory  change.    SPLEEN: Normal size.    ADRENAL GLANDS: No significant nodules.    KIDNEYS/BLADDER: No significant mass, stones, or hydronephrosis.    BOWEL: Inflammation around a diverticulum in the sigmoid colon  compatible with acute diverticulitis. Question a 1.1 cm abscess in the  wall of the sigmoid colon. No free air.    PELVIC ORGANS: No pelvic masses.    ADDITIONAL FINDINGS: Minimal pelvic free fluid. No abscess.    MUSCULOSKELETAL: No frankly destructive bony lesions.      Impression    IMPRESSION: Acute sigmoid diverticulitis with a possible tiny wall  abscess that is not amenable to percutaneous drainage.    ALFRED LORENZO MD         SYSTEM ID:  [id number]       Medications   droPERidol (INAPSINE) injection 0.625 mg (0.625 mg Intravenous $Given 5/30/24 0735)   ketorolac (TORADOL) injection 15 mg (15 mg Intravenous $Given 5/30/24 0735)   CT Saline (64 mLs Intravenous $Given 5/30/24 0746)   iopamidol (ISOVUE-370) solution 94 mL (94 mLs Intravenous $Given 5/30/24 0746)   lactated ringers BOLUS 1,000 mL (0 mLs Intravenous Stopped 5/30/24 1032)   amoxicillin-clavulanate (AUGMENTIN) 875-125 MG per tablet 1 tablet (1 tablet Oral $Given 5/30/24 0932)   polyethylene glycol (MIRALAX) Packet 34 g (34 g Oral $Given 5/30/24 0932)       Assessments & Plan (with Medical Decision Making)      Given the patient's history, I am going to get a CT scan to query an appendicitis or diverticulitis.  There is no evidence of zoster.  Basic labs were ordered in triage.  I will review these.  I will give her some droperidol because of her IBS history.  I will also give her some Toradol for her pain.  I will give her some fluids and reassess her after this.  If there are no findings on CT or evidence of constipation, I will give her MiraLax, but obviously this is a diagnosis of exclusion.      I have reviewed the nursing notes.    I have reviewed the findings, diagnosis, plan and need for follow up with the patient.        Medical Decision Making  The patient's presentation was of high complexity (an acute health issue posing potential threat to life or bodily function).    The patient's evaluation involved:  ordering and/or review of 3+ test(s) in this encounter (see separate area of note for details)  independent interpretation of testing performed by another health professional (see separate area of note for details)  discussion of management or test interpretation with another health professional (see separate area of note for details)    The patient's management necessitated moderate risk (prescription drug management including medications given in the ED) and high risk (a decision regarding hospitalization).        New Prescriptions    AMOXICILLIN-CLAVULANATE (AUGMENTIN) 875-125 MG TABLET    Take 1 tablet by mouth 2 times daily for 10 days       Final diagnoses:   Diverticulitis       5/30/2024   [facility] EMERGENCY DEPT       Ezra White MD  05/30/24 1950

## 2024-05-30 NOTE — ED TRIAGE NOTES
Pt states she has a hx of IBS was constipated for 3 days took a bunch of MOM with liquidy results, presents with generalized abdomainal cramping/stabbing, also c/o pain with urination     Triage Assessment (Adult)       Row Name 05/30/24 0500          Triage Assessment    Airway WDL WDL        Respiratory WDL    Respiratory WDL WDL        Skin Circulation/Temperature WDL    Skin Circulation/Temperature WDL WDL        Cardiac WDL    Cardiac WDL WDL        Peripheral/Neurovascular WDL    Peripheral Neurovascular WDL WDL

## 2024-06-15 ENCOUNTER — HEALTH MAINTENANCE LETTER (OUTPATIENT)
Age: 59
End: 2024-06-15

## 2024-06-20 ENCOUNTER — HOSPITAL ENCOUNTER (OUTPATIENT)
Dept: CARDIOLOGY | Facility: CLINIC | Age: 59
Discharge: HOME | End: 2024-06-20
Attending: NURSE PRACTITIONER | Admitting: NURSE PRACTITIONER
Payer: COMMERCIAL

## 2024-06-20 DIAGNOSIS — R07.9 CHEST PAIN, UNSPECIFIED TYPE: ICD-10-CM

## 2024-06-20 LAB — LVEF ECHO: NORMAL

## 2024-06-20 PROCEDURE — 93306 TTE W/DOPPLER COMPLETE: CPT

## 2024-06-20 PROCEDURE — 93306 TTE W/DOPPLER COMPLETE: CPT | Mod: 26 | Performed by: INTERNAL MEDICINE

## 2024-07-16 ENCOUNTER — LAB (OUTPATIENT)
Dept: LAB | Facility: CLINIC | Age: 59
End: 2024-07-16
Payer: COMMERCIAL

## 2024-07-16 DIAGNOSIS — B35.1 FUNGAL INFECTION OF TOENAIL: ICD-10-CM

## 2024-07-16 LAB
ALBUMIN SERPL BCG-MCNC: 4.2 G/DL (ref 3.5–5.2)
ALP SERPL-CCNC: 77 U/L (ref 40–150)
ALT SERPL W P-5'-P-CCNC: 37 U/L (ref 0–50)
AST SERPL W P-5'-P-CCNC: 28 U/L (ref 0–45)
BILIRUB DIRECT SERPL-MCNC: <0.2 MG/DL (ref 0–0.3)
BILIRUB SERPL-MCNC: 0.3 MG/DL
PROT SERPL-MCNC: 6.8 G/DL (ref 6.4–8.3)

## 2024-07-16 PROCEDURE — 36415 COLL VENOUS BLD VENIPUNCTURE: CPT

## 2024-07-16 PROCEDURE — 80076 HEPATIC FUNCTION PANEL: CPT

## 2024-07-17 ENCOUNTER — OFFICE VISIT (OUTPATIENT)
Dept: FAMILY MEDICINE | Facility: CLINIC | Age: 59
End: 2024-07-17
Payer: COMMERCIAL

## 2024-07-17 VITALS
TEMPERATURE: 98.4 F | SYSTOLIC BLOOD PRESSURE: 136 MMHG | HEIGHT: 66 IN | WEIGHT: 199 LBS | DIASTOLIC BLOOD PRESSURE: 84 MMHG | HEART RATE: 50 BPM | RESPIRATION RATE: 16 BRPM | OXYGEN SATURATION: 98 % | BODY MASS INDEX: 31.98 KG/M2

## 2024-07-17 DIAGNOSIS — Z12.31 VISIT FOR SCREENING MAMMOGRAM: ICD-10-CM

## 2024-07-17 DIAGNOSIS — L25.9 CONTACT DERMATITIS, UNSPECIFIED CONTACT DERMATITIS TYPE, UNSPECIFIED TRIGGER: Primary | ICD-10-CM

## 2024-07-17 PROCEDURE — 99213 OFFICE O/P EST LOW 20 MIN: CPT | Performed by: NURSE PRACTITIONER

## 2024-07-17 RX ORDER — TRIAMCINOLONE ACETONIDE 1 MG/G
CREAM TOPICAL 2 TIMES DAILY
Qty: 45 G | Refills: 1 | Status: SHIPPED | OUTPATIENT
Start: 2024-07-17

## 2024-07-17 ASSESSMENT — PAIN SCALES - GENERAL: PAINLEVEL: NO PAIN (0)

## 2024-07-17 NOTE — PROGRESS NOTES
"  Assessment & Plan     Contact dermatitis, unspecified contact dermatitis type, unspecified trigger  Consistent with a contact dermatitis, plan triamcinolone. Symptomatic care and follow up discussed.  - triamcinolone (KENALOG) 0.1 % external cream; Apply topically 2 times daily For up to two weeks    Visit for screening mammogram  Due for screening  - MA Screening Bilateral w/ Stan; Future      Subjective   Leslie is a 59 year old, presenting for the following health issues:  Rash        7/17/2024    10:28 AM   Additional Questions   Roomed by JULIANA Kerns     History of Present Illness       Reason for visit:  Rash on ankle  Symptom onset:  1-2 weeks ago  Symptoms include:  Rash on ankle  Symptom intensity:  Mild  Symptom progression:  Worsening  Had these symptoms before:  No  What makes it worse:  No  What makes it better:  No    She eats 2-3 servings of fruits and vegetables daily.She consumes 0 sweetened beverage(s) daily.She exercises with enough effort to increase her heart rate 20 to 29 minutes per day.  She exercises with enough effort to increase her heart rate 3 or less days per week.   She is taking medications regularly.   Antifungal cream and spray, peroxide, rubbing alcohol. Feels its spreading up leg. Itchy       Review of Systems  Constitutional, HEENT, cardiovascular, pulmonary, gi and gu systems are negative, except as otherwise noted.      Objective    /84 (Cuff Size: Adult Large)   Pulse 50   Temp 98.4  F (36.9  C) (Tympanic)   Resp 16   Ht 1.676 m (5' 6\")   Wt 90.3 kg (199 lb)   LMP  (LMP Unknown)   SpO2 98%   BMI 32.12 kg/m    Body mass index is 32.12 kg/m .  Physical Exam   GENERAL: alert and no distress  SKIN: cluster of erythematous papules/tiny vesicles present right ankle  PSYCH: mentation appears normal, affect normal/bright          Signed Electronically by: CHUNG Frias CNP    "

## 2024-07-17 NOTE — PATIENT INSTRUCTIONS
Triamcinolone cream sent to the pharmacy- apply twice daily for up to 14 days    Apply cool compresses as needed    Try to avoid itching, wash hands after touching    Follow up if symptoms do not improve or worsen.

## 2024-12-03 ENCOUNTER — OFFICE VISIT (OUTPATIENT)
Dept: FAMILY MEDICINE | Facility: CLINIC | Age: 59
End: 2024-12-03
Payer: COMMERCIAL

## 2024-12-03 VITALS
WEIGHT: 205 LBS | HEART RATE: 50 BPM | SYSTOLIC BLOOD PRESSURE: 128 MMHG | HEIGHT: 65 IN | TEMPERATURE: 97.7 F | DIASTOLIC BLOOD PRESSURE: 68 MMHG | BODY MASS INDEX: 34.16 KG/M2 | OXYGEN SATURATION: 97 % | RESPIRATION RATE: 16 BRPM

## 2024-12-03 DIAGNOSIS — F32.9 REACTIVE DEPRESSION: ICD-10-CM

## 2024-12-03 DIAGNOSIS — F41.1 GAD (GENERALIZED ANXIETY DISORDER): ICD-10-CM

## 2024-12-03 DIAGNOSIS — L98.9 SKIN LESION: ICD-10-CM

## 2024-12-03 DIAGNOSIS — M18.0 ARTHRITIS OF CARPOMETACARPAL (CMC) JOINT OF BOTH THUMBS: Primary | ICD-10-CM

## 2024-12-03 PROCEDURE — 99214 OFFICE O/P EST MOD 30 MIN: CPT | Performed by: NURSE PRACTITIONER

## 2024-12-03 RX ORDER — BUPROPION HYDROCHLORIDE 150 MG/1
150 TABLET ORAL EVERY MORNING
Qty: 30 TABLET | Refills: 1 | Status: SHIPPED | OUTPATIENT
Start: 2024-12-03

## 2024-12-03 ASSESSMENT — ANXIETY QUESTIONNAIRES
IF YOU CHECKED OFF ANY PROBLEMS ON THIS QUESTIONNAIRE, HOW DIFFICULT HAVE THESE PROBLEMS MADE IT FOR YOU TO DO YOUR WORK, TAKE CARE OF THINGS AT HOME, OR GET ALONG WITH OTHER PEOPLE: SOMEWHAT DIFFICULT
3. WORRYING TOO MUCH ABOUT DIFFERENT THINGS: MORE THAN HALF THE DAYS
6. BECOMING EASILY ANNOYED OR IRRITABLE: NEARLY EVERY DAY
GAD7 TOTAL SCORE: 10
5. BEING SO RESTLESS THAT IT IS HARD TO SIT STILL: SEVERAL DAYS
1. FEELING NERVOUS, ANXIOUS, OR ON EDGE: SEVERAL DAYS
2. NOT BEING ABLE TO STOP OR CONTROL WORRYING: SEVERAL DAYS
GAD7 TOTAL SCORE: 10
7. FEELING AFRAID AS IF SOMETHING AWFUL MIGHT HAPPEN: SEVERAL DAYS

## 2024-12-03 ASSESSMENT — PATIENT HEALTH QUESTIONNAIRE - PHQ9
5. POOR APPETITE OR OVEREATING: SEVERAL DAYS
SUM OF ALL RESPONSES TO PHQ QUESTIONS 1-9: 9

## 2024-12-03 ASSESSMENT — PAIN SCALES - GENERAL: PAINLEVEL_OUTOF10: MODERATE PAIN (4)

## 2024-12-03 NOTE — PATIENT INSTRUCTIONS
Make appointment with dermatology for skin check and treatment.  Make appointment with ortho hand surgeon to discuss steroid injections versus other treatment for thumb joint arthritis that is causing you pain.  Take Wellbutrin once daily and follow-up in 1 month with virtual or in person visit to recheck depression/BENJAMIN.  Please call [phone] (behavioral health access) and ask for Alisha Ordoñez.

## 2024-12-03 NOTE — PROGRESS NOTES
Assessment & Plan     Arthritis of carpometacarpal (CMC) joint of both thumbs  X-rays reviewed in March 2024 showing significant arthritis in the CMC joints of both thumbs.  Referred to orthopedic hand specialist for possible injections versus surgery.  - Orthopedic  Referral; Future    Skin lesion  Referral to dermatology for full body skin check.  - Adult Dermatology  Referral; Future    BENJAMIN (generalized anxiety disorder)  Starting wellbutrin due to concerns for weight gain on selective serotonin reuptake inhibitor/SNRI.  Discussed side effect potential and to stop if any significant effects.  Advised follow-up in 1 month virtual or in person to recheck mood.  - buPROPion (WELLBUTRIN XL) 150 MG 24 hr tablet; Take 1 tablet (150 mg) by mouth every morning.    Reactive depression  See note above.  - buPROPion (WELLBUTRIN XL) 150 MG 24 hr tablet; Take 1 tablet (150 mg) by mouth every morning.    See Patient Instructions    Piter Nelson is a 59 year old, presenting for the following health issues:  Mole and Thumb Discomfort        12/3/2024    12:47 PM   Additional Questions   Roomed by b   Accompanied by self         12/3/2024    12:47 PM   Patient Reported Additional Medications   Patient reports taking the following new medications none     History of Present Illness       Reason for visit:  Moles and thumb joints   She is taking medications regularly.    Pain History:  When did you first notice your pain? 1 year   Have you seen this provider for your pain in the past? No   Where in your body do your have pain? Bilateral thumbs  Are you seeing anyone else for your pain? No  What makes your pain better? none  What makes your pain worse? Using it   How has pain affected your ability to work? Not applicable  Who lives in your household?  and mother    Patient has had x-rays completed in March 2024 showing moderate to severe degenerative joint changes in the CMC joints of both  thumbs.  She notices that symptoms are worsening.  She is at home taking care of her mother who had a stroke which is very stressful for her and she also wants to discuss depression and anxiety since this seems to be out of control as well due to all her stress.  She states that the thumb issue is getting worse because of how much she is having to lift and move her mother.    Patient also states that she grew up in [place] and was out in the sun a lot.  She starting to get lesions between her breasts and on her skin that she would like checked as well.        11/23/2021     9:46 AM 7/7/2022     7:28 AM 12/3/2024    12:53 PM   PHQ-9 SCORE   PHQ-9 Total Score MyChart 3 (Minimal depression) 7 (Mild depression)    PHQ-9 Total Score 3 7 9           3/17/2021    11:07 AM 11/23/2021     9:48 AM 12/3/2024    12:53 PM   BENJAMIN-7 SCORE   Total Score  4 (minimal anxiety)    Total Score 4 4 10           11/23/2021     9:51 AM 12/3/2024    12:53 PM   PEG Score   PEG Total Score 4 6     Skin Lesion/moles  Onset/Duration: a couple of years ,changing  Description  Location: chest  Color: brown and pink  Border description: irregular border, raised, red  Character: raised, painful  Itching: mild  Bleeding:  No  Intensity:  moderate  Progression of Symptoms:  worsening  Accompanying signs and symptoms:   Bleeding: No  Scaling: No  Excessive sun exposure/tanning: YES  Sunscreen used: YES  History:           Any previous history of skin cancer: No  Any family history of melanoma: YES  Previous episodes of similar lesion: No  Precipitating or alleviating factors: none  Therapies tried and outcome: none      Review of Systems  CONSTITUTIONAL: NEGATIVE for fever, chills, change in weight  INTEGUMENTARY/SKIN: POSITIVE for multiple skin lesions on chest and cleavage and multiple moles, concerns due to family hx of skin cancer and excessive sun exposure when living in [place]  RESP: NEGATIVE for significant cough or SOB  CV: NEGATIVE for  "chest pain, palpitations or peripheral edema  MUSCULOSKELETAL: POSITIVE  for CMC join pain bilaterally worsening  PSYCHIATRIC: POSITIVE for anxiety, depressed mood, and stress due to weight gain and care giving for her mother who has hx of CVA and is unable to take care of herself, frustrated with respite care help that is really not helpful.  ROS otherwise negative      Objective    /68   Pulse 50   Temp 97.7  F (36.5  C) (Tympanic)   Resp 16   Ht 1.646 m (5' 4.8\")   Wt 93 kg (205 lb)   LMP  (LMP Unknown)   SpO2 97%   BMI 34.32 kg/m    Body mass index is 34.32 kg/m .  Physical Exam   GENERAL: alert and no distress  MS: patient has some changes noted in the CMC joints, there is some changes with ROM and pain with movement of the joint passively and tenderness, no redness or warmth noted bilaterally  SKIN: pt has raised light brown to flesh colored lesions on her chest and cleavage not consistent with moles and also multiple moles generalized.  PSYCH: affect flat and tearful    Signed Electronically by: Peyton Cook NP    "

## 2024-12-04 ENCOUNTER — PATIENT OUTREACH (OUTPATIENT)
Dept: CARE COORDINATION | Facility: CLINIC | Age: 59
End: 2024-12-04
Payer: COMMERCIAL

## 2024-12-09 ENCOUNTER — TELEPHONE (OUTPATIENT)
Dept: FAMILY MEDICINE | Facility: CLINIC | Age: 59
End: 2024-12-09
Payer: COMMERCIAL

## 2024-12-09 NOTE — LETTER
December 9, 2024    To  Leslie Turner  [address] [place] [id number]    Your team at [facility] cares about your health. We have reviewed your chart and based on our findings; we are making the following recommendations to better manage your health.     You are in particular need of attention regarding the following:     Schedule Annual MAMMOGRAPHY. [facility] scheduling number is [phone] or schedule in [facility] (self referral).  PREVENTATIVE VISIT: Physical    If you have already completed these items, please contact the clinic via phone or   [facility] so your care team can review and update your records. Thank you for   choosing [facility] for your healthcare needs. For any questions,   concerns, or to schedule an appointment please contact our clinic.    Healthy Regards,      Your [facility] Care Team

## 2024-12-09 NOTE — TELEPHONE ENCOUNTER
Patient Quality Outreach    Patient is due for the following:   Breast Cancer Screening - Mammogram    Action(s) Taken:   Pt to schedule    Type of outreach:    Sent letter.    Questions for provider review:    None           Thad Begum

## 2024-12-16 ENCOUNTER — HOSPITAL ENCOUNTER (OUTPATIENT)
Dept: MAMMOGRAPHY | Facility: CLINIC | Age: 59
Discharge: HOME | End: 2024-12-16
Attending: NURSE PRACTITIONER | Admitting: NURSE PRACTITIONER
Payer: COMMERCIAL

## 2024-12-16 DIAGNOSIS — Z12.31 VISIT FOR SCREENING MAMMOGRAM: ICD-10-CM

## 2024-12-16 PROCEDURE — 77067 SCR MAMMO BI INCL CAD: CPT

## 2024-12-16 PROCEDURE — 77063 BREAST TOMOSYNTHESIS BI: CPT

## 2024-12-29 ASSESSMENT — ANXIETY QUESTIONNAIRES
8. IF YOU CHECKED OFF ANY PROBLEMS, HOW DIFFICULT HAVE THESE MADE IT FOR YOU TO DO YOUR WORK, TAKE CARE OF THINGS AT HOME, OR GET ALONG WITH OTHER PEOPLE?: SOMEWHAT DIFFICULT
IF YOU CHECKED OFF ANY PROBLEMS ON THIS QUESTIONNAIRE, HOW DIFFICULT HAVE THESE PROBLEMS MADE IT FOR YOU TO DO YOUR WORK, TAKE CARE OF THINGS AT HOME, OR GET ALONG WITH OTHER PEOPLE: SOMEWHAT DIFFICULT
2. NOT BEING ABLE TO STOP OR CONTROL WORRYING: SEVERAL DAYS
7. FEELING AFRAID AS IF SOMETHING AWFUL MIGHT HAPPEN: SEVERAL DAYS
5. BEING SO RESTLESS THAT IT IS HARD TO SIT STILL: NOT AT ALL
3. WORRYING TOO MUCH ABOUT DIFFERENT THINGS: NEARLY EVERY DAY
6. BECOMING EASILY ANNOYED OR IRRITABLE: NEARLY EVERY DAY
GAD7 TOTAL SCORE: 10
1. FEELING NERVOUS, ANXIOUS, OR ON EDGE: SEVERAL DAYS
GAD7 TOTAL SCORE: 10
4. TROUBLE RELAXING: SEVERAL DAYS
GAD7 TOTAL SCORE: 10
7. FEELING AFRAID AS IF SOMETHING AWFUL MIGHT HAPPEN: SEVERAL DAYS

## 2025-01-03 ENCOUNTER — OFFICE VISIT (OUTPATIENT)
Dept: FAMILY MEDICINE | Facility: CLINIC | Age: 60
End: 2025-01-03
Payer: COMMERCIAL

## 2025-01-03 VITALS
HEIGHT: 65 IN | TEMPERATURE: 97.9 F | HEART RATE: 64 BPM | SYSTOLIC BLOOD PRESSURE: 118 MMHG | DIASTOLIC BLOOD PRESSURE: 66 MMHG | OXYGEN SATURATION: 98 % | RESPIRATION RATE: 20 BRPM | WEIGHT: 204.8 LBS | BODY MASS INDEX: 34.12 KG/M2

## 2025-01-03 DIAGNOSIS — B35.1 TOENAIL FUNGUS: Primary | ICD-10-CM

## 2025-01-03 DIAGNOSIS — R06.02 SOB (SHORTNESS OF BREATH): ICD-10-CM

## 2025-01-03 DIAGNOSIS — F32.9 REACTIVE DEPRESSION: ICD-10-CM

## 2025-01-03 DIAGNOSIS — F41.1 GAD (GENERALIZED ANXIETY DISORDER): ICD-10-CM

## 2025-01-03 LAB
ALBUMIN SERPL BCG-MCNC: 4.3 G/DL (ref 3.5–5.2)
ALP SERPL-CCNC: 73 U/L (ref 40–150)
ALT SERPL W P-5'-P-CCNC: 30 U/L (ref 0–50)
AST SERPL W P-5'-P-CCNC: 21 U/L (ref 0–45)
BILIRUB DIRECT SERPL-MCNC: <0.2 MG/DL (ref 0–0.3)
BILIRUB SERPL-MCNC: 0.4 MG/DL
PROT SERPL-MCNC: 6.9 G/DL (ref 6.4–8.3)

## 2025-01-03 PROCEDURE — 99214 OFFICE O/P EST MOD 30 MIN: CPT | Performed by: NURSE PRACTITIONER

## 2025-01-03 PROCEDURE — 36415 COLL VENOUS BLD VENIPUNCTURE: CPT | Performed by: NURSE PRACTITIONER

## 2025-01-03 PROCEDURE — 80076 HEPATIC FUNCTION PANEL: CPT | Performed by: NURSE PRACTITIONER

## 2025-01-03 RX ORDER — BUPROPION HYDROCHLORIDE 150 MG/1
150 TABLET ORAL EVERY MORNING
Qty: 90 TABLET | Refills: 1 | Status: SHIPPED | OUTPATIENT
Start: 2025-01-03

## 2025-01-03 RX ORDER — TERBINAFINE HYDROCHLORIDE 250 MG/1
250 TABLET ORAL DAILY
Qty: 90 TABLET | Refills: 0 | Status: SHIPPED | OUTPATIENT
Start: 2025-01-03 | End: 2025-04-03

## 2025-01-03 RX ORDER — ALBUTEROL SULFATE 90 UG/1
2 INHALANT RESPIRATORY (INHALATION) EVERY 6 HOURS PRN
Qty: 18 G | Refills: 1 | Status: SHIPPED | OUTPATIENT
Start: 2025-01-03

## 2025-01-03 ASSESSMENT — PAIN SCALES - GENERAL: PAINLEVEL_OUTOF10: MILD PAIN (3)

## 2025-01-03 NOTE — PROGRESS NOTES
Assessment & Plan     Toenail fungus  LFT completed and normal.  Starting Terbinafine daily for 3 months.  Standing lab for recheck in 6 weeks and at the end of treatment on liver function.  Dermatology referral placed to follow-up with if this is not improving on treatment.  - terbinafine (LAMISIL) 250 MG tablet; Take 1 tablet (250 mg) by mouth daily.  - Hepatic panel (Albumin, ALT, AST, Bili, Alk Phos, TP); Standing  - Adult Dermatology  Referral; Future  - Hepatic panel (Albumin, ALT, AST, Bili, Alk Phos, TP)    BENJAMIN (generalized anxiety disorder)  Stable.  Refilled wellbutrin at current dose.  - buPROPion (WELLBUTRIN XL) 150 MG 24 hr tablet; Take 1 tablet (150 mg) by mouth every morning.    Reactive depression  See note above.  - buPROPion (WELLBUTRIN XL) 150 MG 24 hr tablet; Take 1 tablet (150 mg) by mouth every morning.    SOB (shortness of breath)  Refilled albuterol for as needed use during the year with URI's.  - albuterol (PROAIR HFA/PROVENTIL HFA/VENTOLIN HFA) 108 (90 Base) MCG/ACT inhaler; Inhale 2 puffs into the lungs every 6 hours as needed for shortness of breath, wheezing or cough.    See Patient Instructions    Piter Nelson is a 59 year old, presenting for the following health issues:  Anxiety, Depression, Recheck Medication, Medication Request (Would like to get terbinafine refilled because her nail issue has not resolved), and Health Maintenance (Declines vaccinations today)        1/3/2025     2:41 PM   Additional Questions   Roomed by Mary DEGROOT LPN   Accompanied by self         1/3/2025     2:41 PM   Patient Reported Additional Medications   Patient reports taking the following new medications Provitalized - energy and weight loss OTC      History of Present Illness       Mental Health Follow-up:  Patient presents to follow-up on Depression & Anxiety.Patient's depression since last visit has been:  Better  The patient is not having other symptoms associated with  depression.  Patient's anxiety since last visit has been:  Medium  The patient is not having other symptoms associated with anxiety.  Any significant life events: relationship concerns, financial concerns, grief or loss and health concerns  Patient is feeling anxious or having panic attacks.  Patient has no concerns about alcohol or drug use.    She eats 2-3 servings of fruits and vegetables daily.She consumes 0 sweetened beverage(s) daily.She exercises with enough effort to increase her heart rate 30 to 60 minutes per day.  She exercises with enough effort to increase her heart rate 3 or less days per week.   She is taking medications regularly.     Patient is still having some depression and anxiety but does feel that it is tolerable.  She has weight gain on selective serotonin reuptake inhibitor's in the past so does not really want to be on medications but is tolerating Wellbutrin at 150mg and hesitant to increase this dose at this time.  She states that some of the things she is going through are temporary and she is working through them as well.  She denies counseling at this time as well.        11/23/2021     9:48 AM 12/3/2024    12:53 PM 12/29/2024    11:46 AM   BENJAMIN-7 SCORE   Total Score 4 (minimal anxiety)  10 (moderate anxiety)   Total Score 4 10 10        Patient-reported           7/7/2022     7:28 AM 12/3/2024    12:53 PM 1/3/2025     2:39 PM   PHQ   PHQ-9 Total Score 7 9 7    Q9: Thoughts of better off dead/self-harm past 2 weeks Not at all Not at all Not at all       Patient-reported     Medication Followup of albuterol inhaler PRN  Taking Medication as prescribed: yes  Side Effects:  None  Medication Helping Symptoms:  yes  Patient uses the inhaler as needed when she has issues with URI/allergies        Review of Systems  CONSTITUTIONAL: NEGATIVE for fever, chills, change in weight  RESP: NEGATIVE for significant cough or SOB  CV: NEGATIVE for chest pain, palpitations or peripheral  "edema  PSYCHIATRIC: POSITIVE for Hx anxiety and Hx depression  ROS otherwise negative      Objective    /66 (BP Location: Right arm, Patient Position: Sitting, Cuff Size: Adult Regular)   Pulse 64   Temp 97.9  F (36.6  C) (Tympanic)   Resp 20   Ht 1.646 m (5' 4.8\")   Wt 92.9 kg (204 lb 12.8 oz)   LMP  (LMP Unknown)   SpO2 98%   BMI 34.29 kg/m    Body mass index is 34.29 kg/m .  Physical Exam   GENERAL: alert and no distress  RESP: lungs clear to auscultation - no rales, rhonchi or wheezes  CV: regular rate and rhythm, normal S1 S2, no S3 or S4, no murmur, click or rub, no peripheral edema  SKIN: toenail is cut back by patient and is raised and thick at the ends of the nails on all the nails, worse on the big toe, I attempted to get picture but it would not upload  PSYCH: mentation appears normal and affect flat        Results for orders placed or performed in visit on 01/03/25   Hepatic panel (Albumin, ALT, AST, Bili, Alk Phos, TP)     Status: Normal   Result Value Ref Range    Protein Total 6.9 6.4 - 8.3 g/dL    Albumin 4.3 3.5 - 5.2 g/dL    Bilirubin Total 0.4 <=1.2 mg/dL    Alkaline Phosphatase 73 40 - 150 U/L    AST 21 0 - 45 U/L    ALT 30 0 - 50 U/L    Bilirubin Direct <0.20 0.00 - 0.30 mg/dL           Signed Electronically by: Peyton Cook NP    "

## 2025-01-03 NOTE — PATIENT INSTRUCTIONS
Liver function testing today, if normal, start terbinafine daily.  Recheck liver function in 6 weeks and then again at the end of treatment for monitoring.  I referred to dermatology for evaluation if nails are not improving.  I refilled Wellbutrin for 6 months.  Follow-up in 6 months for recheck.  Refilled albuterol inhaler for as needed use.  Call your pharmacy if you need a refill.

## 2025-02-03 ENCOUNTER — OFFICE VISIT (OUTPATIENT)
Dept: ORTHOPEDICS | Facility: CLINIC | Age: 60
End: 2025-02-03
Attending: NURSE PRACTITIONER
Payer: COMMERCIAL

## 2025-02-03 DIAGNOSIS — M18.0 ARTHRITIS OF CARPOMETACARPAL (CMC) JOINT OF BOTH THUMBS: ICD-10-CM

## 2025-02-03 PROCEDURE — 99244 OFF/OP CNSLTJ NEW/EST MOD 40: CPT | Mod: 25 | Performed by: FAMILY MEDICINE

## 2025-02-03 PROCEDURE — 20604 DRAIN/INJ JOINT/BURSA W/US: CPT | Mod: 50 | Performed by: FAMILY MEDICINE

## 2025-02-03 RX ORDER — BETAMETHASONE SODIUM PHOSPHATE AND BETAMETHASONE ACETATE 3; 3 MG/ML; MG/ML
6 INJECTION, SUSPENSION INTRA-ARTICULAR; INTRALESIONAL; INTRAMUSCULAR; SOFT TISSUE
Status: COMPLETED | OUTPATIENT
Start: 2025-02-03 | End: 2025-02-03

## 2025-02-03 RX ORDER — ROPIVACAINE HYDROCHLORIDE 5 MG/ML
1 INJECTION, SOLUTION EPIDURAL; INFILTRATION; PERINEURAL
Status: COMPLETED | OUTPATIENT
Start: 2025-02-03 | End: 2025-02-03

## 2025-02-03 RX ADMIN — BETAMETHASONE SODIUM PHOSPHATE AND BETAMETHASONE ACETATE 6 MG: 3; 3 INJECTION, SUSPENSION INTRA-ARTICULAR; INTRALESIONAL; INTRAMUSCULAR; SOFT TISSUE at 13:23

## 2025-02-03 RX ADMIN — ROPIVACAINE HYDROCHLORIDE 1 ML: 5 INJECTION, SOLUTION EPIDURAL; INFILTRATION; PERINEURAL at 13:23

## 2025-02-03 NOTE — PROGRESS NOTES
ASSESSMENT & PLAN    Leslie was seen today for pain and pain.    Diagnoses and all orders for this visit:    Arthritis of carpometacarpal (CMC) joint of both thumbs  -     Orthopedic  Referral      This issue is acute on chronic and Worsening.    # Bilateral CMC Arthritis: Leslie Turner  was seen today for acute on chronic bilateral hand pain. Symptoms had been going on for several years, worsening over the past few months. On examination there are positive findings of tenderness to palpation over the CMC joints. Imaging findings showed severe bilateral CMC arthritis. Likely cause of patient's condition due to flare of CMC arthritis.  Counseled patient on nature of condition and treatment options.  Given this plan as below, follow-up 1 mon as needed.     Image Findings: Severe bilateral CMC arthritis  Treatment: Activities as tolerated, home exercises given today  Job: As tolerated  Medications/Injections: Limited tylenol/ibuprofen for pain for 1-2 weeks, Topical Voltaren gel, bilateral CMC joint steroid injections  Follow-up: In one month if symptoms do not improve, sooner if worsening  Can consider repeat evaluation/further treatments      Camron Clay MD  [facility]    -----  Chief Complaint   Patient presents with    Right Thumb - Pain    Left Thumb - Pain       SUBJECTIVE  Leslie Turner is a/an 60 year old female who is seen in consultation at the request of  Peyton Cook N.P. for evaluation of bilateral thumb pain. Reviewed PCP notes.    The patient is seen by themselves.  The patient is Right handed    Onset: Several years(s) ago. Reports insidious onset without acute precipitating event. Saw PCP 12/3/24 and bilateral hand xrays werre performed.   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      REVIEW OF SYSTEMS:  Review of Systems  Constitutional, HEENT, cardiovascular, pulmonary, gi and gu systems are negative, except as otherwise noted.    OBJECTIVE:  LMP  (LMP Unknown)    General: healthy, alert and in no distress  HEENT: no scleral icterus or conjunctival erythema  Skin: no suspicious lesions or rash. No jaundice.  CV: distal perfusion intact    Resp: normal respiratory effort without conversational dyspnea   Psych: normal mood and affect  Gait: normal steady gait with appropriate coordination and balance    Neuro: Normal light sensory exam of bilateral lower extremities    Ortho Exam   BILATERAL HAND  Inspection:    No swelling, bruising, discoloration, or obvious deformity or asymmetry  Palpation:   Carpals: normal   Metacarpals: normal   Thumb: CMC   Fingers: normal  Range of Motion:    Full active flexion and extension at MCP, PIP, and DIP joints; normal finger cascade without malrotation.  Wrist pronation, supination, and ulnar/radial deviation normal.  Strength:     full  Special Tests:    Positive: CMC grind    Negative: flexor digitorum superficialis testing, flexor digitorum profundus testing    RADIOLOGY:  I independently, visualized and reviewed these images with the patient     EXAM: XR HAND RIGHT G/E 3 VIEWS  LOCATION: [facility]  DATE: 3/17/2024     INDICATION: Right thumb pain.  COMPARISON: None.                                                                      IMPRESSION: Right hand negative for fracture or dislocation. Severe degenerative arthritis at the first CMC joint.      EXAM: XR HAND LEFT G/E 3 VIEWS  LOCATION: [facility]  DATE: 3/17/2024     INDICATION: Left  hand pain after trauma.  COMPARISON: None.                                                                      IMPRESSION: Left hand negative for fracture or dislocation. Severe degenerative arthritis at the first CMC joint. Mild degenerative arthritic changes in the interphalangeal joints. Mild soft tissue swelling in the second finger. No radiodense foreign   body.    Review of external notes as documented elsewhere in note  Review of the result(s) of each unique test - bilateral hand x-rays       Disclaimer: This note consists of symbols derived from keyboarding, dictation and/or voice recognition software. As a result, there may be errors in the script that have gone undetected. Please consider this when interpreting information found in this chart.    Small Joint Injection/Arthrocentesis: bilateral thumb CMC    Date/Time: 2/3/2025 1:23 PM    Performed by: Camron Clay MD  Authorized by: Camron Clay MD  Indications:  Pain  Needle Size:  25 G  Guidance: ultrasound     Approach:  Radial  Location:  Thumb  Laterality:  Bilateral  Site:  Bilateral thumb CMC    Medications (Right):  6 mg betamethasone acet & sod phos 6 (3-3) MG/ML; 1 mL ROPivacaine 5 MG/ML        Medications (Left):  6 mg betamethasone acet & sod phos 6 (3-3) MG/ML; 1 mL ROPivacaine 5 MG/ML                Outcome:  Tolerated well, no immediate complications  Procedure discussed: discussed risks, benefits, and alternatives    Consent Given by:  Patient  Timeout: timeout called immediately prior to procedure    Prep: patient was prepped and draped in usual sterile fashion       Ultrasound images of procedure were permanently stored.

## 2025-02-03 NOTE — PATIENT INSTRUCTIONS
# Bilateral CMC Arthritis: Leslie Turner  was seen today for acute on chronic bilateral hand pain. Symptoms had been going on for several years, worsening over the past few months. On examination there are positive findings of tenderness to palpation over the CMC joints. Imaging findings showed severe bilateral CMC arthritis. Likely cause of patient's condition due to flare of CMC arthritis.  Counseled patient on nature of condition and treatment options.  Given this plan as below, follow-up 1 mon as needed.     Image Findings: Severe bilateral CMC arthritis  Treatment: Activities as tolerated, home exercises given today  Job: As tolerated  Medications/Injections: Limited tylenol/ibuprofen for pain for 1-2 weeks, Topical Voltaren gel, bilateral CMC joint steroid injections  Follow-up: In one month if symptoms do not improve, sooner if worsening  Can consider repeat evaluation/further treatments    Please call [phone]   Ask for my team if you have any questions or concerns    If you have not yet received the influenza vaccine but would like to get one, please call  [phone] or you can schedule via [employer]    It was great seeing you today!    Camron Clay MD, [facility]     CMC Joint Thumb Stabilizer Brace for Osteoarthritis, Arthritis Pain Injury Relief Support, Spica Splint for Women and Men         FSOC Injection Discharge Instructions    Procedure: bilateral CMC joint steroid injections    You may shower, however avoid swimming, tub baths or hot tubs for 24 hours following your procedure  You may have a mild to moderate increase in pain for several days following the injection.  It may take up to 14 days for the steroid medication to start working although you may feel the effect as early as a few days after the procedure.  You may use ice packs for 10-15 minutes, 3 to 4 times a day at the injection site for comfort  You may use anti-inflammatory medications (such as Ibuprofen or Aleve or Advil)  or Tylenol for pain control if necessary  If you were fasting, you may resume your normal diet and medications after the procedure  If you have diabetes, check your blood sugar more frequently than usual as your blood sugar may be higher than normal for 10-14 days following a steroid injection. Contact your doctor who manages your diabetes if your blood sugar is higher than usual    If you experience any of the following, call [facility] @ [phone] or [phone]  -Fever over 100 degree F  -Swelling, bleeding, redness, drainage, warmth at the injection site  - New or worsening pain

## 2025-02-03 NOTE — LETTER
2/3/2025      Leslie Turner  [address] [facility] [place] [address]      Dear Colleague,    Thank you for referring your patient, Leslie Turner, to the [facility]. Please see a copy of my visit note below.    ASSESSMENT & PLAN    Leslie was seen today for pain and pain.    Diagnoses and all orders for this visit:    Arthritis of carpometacarpal (CMC) joint of both thumbs  -     Orthopedic  Referral      This issue is acute on chronic and Worsening.    # Bilateral CMC Arthritis: Leslie Turner  was seen today for acute on chronic bilateral hand pain. Symptoms had been going on for several years, worsening over the past few months. On examination there are positive findings of tenderness to palpation over the CMC joints. Imaging findings showed severe bilateral CMC arthritis. Likely cause of patient's condition due to flare of CMC arthritis.  Counseled patient on nature of condition and treatment options.  Given this plan as below, follow-up 1 mon as needed.     Image Findings: Severe bilateral CMC arthritis  Treatment: Activities as tolerated, home exercises given today  Job: As tolerated  Medications/Injections: Limited tylenol/ibuprofen for pain for 1-2 weeks, Topical Voltaren gel, bilateral CMC joint steroid injections  Follow-up: In one month if symptoms do not improve, sooner if worsening  Can consider repeat evaluation/further treatments      Camron Clay MD  [facility]    -----  Chief Complaint   Patient presents with     Right Thumb - Pain     Left Thumb - Pain       SUBJECTIVE  Leslie Turnre is a/an 60 year old female who is seen in consultation at the request of  Peyton Cook N.P. for evaluation of bilateral thumb pain. Reviewed PCP notes.    The patient is seen by themselves.  The patient is Right handed    Onset: Several years(s) ago. Reports insidious onset without acute precipitating  event. Saw PCP 12/3/24 and bilateral hand xrays werre performed.   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      REVIEW OF SYSTEMS:  Review of Systems  Constitutional, HEENT, cardiovascular, pulmonary, gi and gu systems are negative, except as otherwise noted.    OBJECTIVE:  LMP  (LMP Unknown)    General: healthy, alert and in no distress  HEENT: no scleral icterus or conjunctival erythema  Skin: no suspicious lesions or rash. No jaundice.  CV: distal perfusion intact    Resp: normal respiratory effort without conversational dyspnea   Psych: normal mood and affect  Gait: normal steady gait with appropriate coordination and balance    Neuro: Normal light sensory exam of bilateral lower extremities    Ortho Exam   BILATERAL HAND  Inspection:    No swelling, bruising, discoloration, or obvious deformity or asymmetry  Palpation:   Carpals: normal   Metacarpals: normal   Thumb: CMC   Fingers: normal  Range of Motion:    Full active flexion and extension at MCP, PIP, and DIP joints; normal finger cascade without malrotation.  Wrist pronation, supination, and ulnar/radial deviation normal.  Strength:     full  Special Tests:    Positive: CMC grind    Negative: flexor digitorum superficialis testing, flexor digitorum profundus testing    RADIOLOGY:  I independently, visualized and reviewed these images with the patient     EXAM: XR HAND RIGHT G/E 3 VIEWS  LOCATION: [facility]  DATE: 3/17/2024     INDICATION: Right thumb pain.  COMPARISON: None.                                                                       IMPRESSION: Right hand negative for fracture or dislocation. Severe degenerative arthritis at the first CMC joint.      EXAM: XR HAND LEFT G/E 3 VIEWS  LOCATION: [facility]  DATE: 3/17/2024     INDICATION: Left hand pain after trauma.  COMPARISON: None.                                                                      IMPRESSION: Left hand negative for fracture or dislocation. Severe degenerative arthritis at the first CMC joint. Mild degenerative arthritic changes in the interphalangeal joints. Mild soft tissue swelling in the second finger. No radiodense foreign   body.    Review of external notes as documented elsewhere in note  Review of the result(s) of each unique test - bilateral hand x-rays       Disclaimer: This note consists of symbols derived from keyboarding, dictation and/or voice recognition software. As a result, there may be errors in the script that have gone undetected. Please consider this when interpreting information found in this chart.    Small Joint Injection/Arthrocentesis: bilateral thumb CMC    Date/Time: 2/3/2025 1:23 PM    Performed by: Camron Clay MD  Authorized by: Camron Clay MD  Indications:  Pain  Needle Size:  25 G  Guidance: ultrasound     Approach:  Radial  Location:  Thumb  Laterality:  Bilateral  Site:  Bilateral thumb CMC    Medications (Right):  6 mg betamethasone acet & sod phos 6 (3-3) MG/ML; 1 mL ROPivacaine 5 MG/ML        Medications (Left):  6 mg betamethasone acet & sod phos 6 (3-3) MG/ML; 1 mL ROPivacaine 5 MG/ML                Outcome:  Tolerated well, no immediate complications  Procedure discussed: discussed risks, benefits, and alternatives    Consent Given by:  Patient  Timeout: timeout called immediately prior to procedure    Prep: patient was prepped and draped in usual sterile fashion       Ultrasound images of procedure were permanently stored.             Again, thank you for allowing me to  participate in the care of your patient.        Sincerely,        Camron Clay MD    Electronically signed

## 2025-02-13 ENCOUNTER — LAB (OUTPATIENT)
Dept: LAB | Facility: CLINIC | Age: 60
End: 2025-02-13
Payer: COMMERCIAL

## 2025-02-13 DIAGNOSIS — B35.1 TOENAIL FUNGUS: ICD-10-CM

## 2025-02-13 LAB
ALBUMIN SERPL BCG-MCNC: 4.2 G/DL (ref 3.5–5.2)
ALP SERPL-CCNC: 77 U/L (ref 40–150)
ALT SERPL W P-5'-P-CCNC: 31 U/L (ref 0–50)
AST SERPL W P-5'-P-CCNC: 23 U/L (ref 0–45)
BILIRUB DIRECT SERPL-MCNC: <0.2 MG/DL (ref 0–0.3)
BILIRUB SERPL-MCNC: 0.2 MG/DL
PROT SERPL-MCNC: 6.6 G/DL (ref 6.4–8.3)

## 2025-03-09 DIAGNOSIS — R06.02 SOB (SHORTNESS OF BREATH): ICD-10-CM

## 2025-03-10 RX ORDER — ALBUTEROL SULFATE 90 UG/1
INHALANT RESPIRATORY (INHALATION)
Qty: 34 G | Refills: 1 | Status: SHIPPED | OUTPATIENT
Start: 2025-03-10

## 2025-04-02 ASSESSMENT — PATIENT HEALTH QUESTIONNAIRE - PHQ9
SUM OF ALL RESPONSES TO PHQ QUESTIONS 1-9: 2
10. IF YOU CHECKED OFF ANY PROBLEMS, HOW DIFFICULT HAVE THESE PROBLEMS MADE IT FOR YOU TO DO YOUR WORK, TAKE CARE OF THINGS AT HOME, OR GET ALONG WITH OTHER PEOPLE: SOMEWHAT DIFFICULT
SUM OF ALL RESPONSES TO PHQ QUESTIONS 1-9: 2

## 2025-04-03 ENCOUNTER — ANCILLARY ORDERS (OUTPATIENT)
Dept: FAMILY MEDICINE | Facility: CLINIC | Age: 60
End: 2025-04-03

## 2025-04-03 ENCOUNTER — OFFICE VISIT (OUTPATIENT)
Dept: FAMILY MEDICINE | Facility: CLINIC | Age: 60
End: 2025-04-03
Payer: COMMERCIAL

## 2025-04-03 ENCOUNTER — ANCILLARY PROCEDURE (OUTPATIENT)
Dept: GENERAL RADIOLOGY | Facility: CLINIC | Age: 60
End: 2025-04-03
Attending: NURSE PRACTITIONER
Payer: COMMERCIAL

## 2025-04-03 VITALS
HEIGHT: 66 IN | WEIGHT: 212 LBS | SYSTOLIC BLOOD PRESSURE: 128 MMHG | TEMPERATURE: 98.4 F | BODY MASS INDEX: 34.07 KG/M2 | HEART RATE: 55 BPM | OXYGEN SATURATION: 98 % | DIASTOLIC BLOOD PRESSURE: 76 MMHG | RESPIRATION RATE: 16 BRPM

## 2025-04-03 DIAGNOSIS — M54.50 BILATERAL LOW BACK PAIN WITHOUT SCIATICA, UNSPECIFIED CHRONICITY: ICD-10-CM

## 2025-04-03 DIAGNOSIS — F33.41 RECURRENT MAJOR DEPRESSIVE DISORDER, IN PARTIAL REMISSION: ICD-10-CM

## 2025-04-03 DIAGNOSIS — M25.551 BILATERAL HIP PAIN: ICD-10-CM

## 2025-04-03 DIAGNOSIS — K43.2 INCISIONAL HERNIA, WITHOUT OBSTRUCTION OR GANGRENE: Primary | ICD-10-CM

## 2025-04-03 DIAGNOSIS — M25.552 BILATERAL HIP PAIN: ICD-10-CM

## 2025-04-03 DIAGNOSIS — E66.811 CLASS 1 OBESITY DUE TO EXCESS CALORIES WITHOUT SERIOUS COMORBIDITY WITH BODY MASS INDEX (BMI) OF 34.0 TO 34.9 IN ADULT: ICD-10-CM

## 2025-04-03 DIAGNOSIS — F41.1 GAD (GENERALIZED ANXIETY DISORDER): ICD-10-CM

## 2025-04-03 DIAGNOSIS — E66.09 CLASS 1 OBESITY DUE TO EXCESS CALORIES WITHOUT SERIOUS COMORBIDITY WITH BODY MASS INDEX (BMI) OF 34.0 TO 34.9 IN ADULT: ICD-10-CM

## 2025-04-03 RX ORDER — BUPROPION HYDROCHLORIDE 300 MG/1
300 TABLET ORAL EVERY MORNING
Qty: 90 TABLET | Refills: 3 | Status: SHIPPED | OUTPATIENT
Start: 2025-04-03

## 2025-04-03 ASSESSMENT — PAIN SCALES - GENERAL: PAINLEVEL_OUTOF10: NO PAIN (0)

## 2025-04-03 NOTE — PROGRESS NOTES
Assessment & Plan     Incisional hernia, without obstruction or gangrene  Patient appears to have a bulge just above the lower abdominal incision.  There is no tenderness that is significant with palpation however to patient at the discomfort.  Ordered ultrasound hernia evaluate to confirm.  If negative may need to do CT of abdomen for cause of the bulge.  - US Hernia Evaluation; Future    Bilateral hip pain  Ordered XR of pelvis and hip bilateral.  I will notify patient of results when radiology report is back.    Bilateral low back pain without sciatica, unspecified chronicity  Ordered x-ray of the lumbar spine.  I will notify patient of results when radiology report is back.  - XR Lumbar Spine 2/3 Views; Future    BENJAMIN (generalized anxiety disorder)  Will attempt to increase Wellbutrin to 300 mg to see if this is beneficial since patient comes in today and is very tearful.  Anxiety most likely due to everything going on with her mom and home situation.  However with her obesity she does not want to be on a medication that could potentially increase her weight.  Patient advised to make an appointment if she would like to discuss medication changes if this is not working.  - buPROPion (WELLBUTRIN XL) 300 MG 24 hr tablet; Take 1 tablet (300 mg) by mouth every morning.    Recurrent major depressive disorder, in partial remission  See note above.    Class I obesity due to excess calories without serious comorbidity with body mass index (BMI) of 34.0-34.9 in adult  Discussed with patient healthy diet and exercise.  Will increase Wellbutrin to see if there is more assistance with the weight loss on a higher dose as well as to improve her depression/anxiety.  If not working I discussed with her that we could try doing phentermine or getting her into weight management clinic for further management.    BMI  Estimated body mass index is 34.48 kg/m  as calculated from the following:    Height as of this encounter: 1.67 m (5'  "5.75\").    Weight as of this encounter: 96.2 kg (212 lb).   Weight management plan: Discussed healthy diet and exercise guidelines      See Patient Instructions    Subjective   Leslie is a 60 year old, presenting for the following health issues:  Mass (Discomfort ,mass is located below belly button)      4/3/2025     2:46 PM   Additional Questions   Roomed by rmb   Accompanied by self         4/3/2025     2:46 PM   Patient Reported Additional Medications   Patient reports taking the following new medications none     Mass    History of Present Illness       Reason for visit:  Stomach bulge, right upper arm muscle pain,  left shoulder pain,  weight loss  Symptom onset:  3-4 weeks ago  Symptoms include:  Stomach Buldge, pain in shoulder, fingers getting stuck  Symptom intensity:  Moderate  Symptom progression:  Staying the same   She is taking medications regularly.        Patient presents today because she has a lump in her abdomen above an incision.  She states it is not painful but it does give her a lot of discomfort.    Patient states that Butrans really has not been helping much but she is under a lot of stress taking care of her mom, the house in the yard and has issues with her shoulder and multiple joints including hips and back.  She states that she is constantly in pain and about a 3 for all day long and then increases in the evening to about 6-7.  She states that she feels that she is pushing herself all the time.  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  I did advise patient to follow back up with sports medicine on the thumb arthritis.      Review of Systems  CONSTITUTIONAL: NEGATIVE for fever, chills, change in weight  RESP: NEGATIVE for significant cough or SOB  CV: NEGATIVE for chest pain, palpitations or peripheral edema  GI: POSITIVE for lump in " "the abdomen  MUSCULOSKELETAL: POSITIVE for low back pain bilaterally with pain into the hips as well but not down the legs, arthritis in her hands that significant CMJ bilateral hands with sports medicine is had injections with no improvement  PSYCHIATRIC: POSITIVE for history of anxiety and depression, under a lot of stress as per HPI  ROS otherwise negative      Objective    /76   Pulse 55   Temp 98.4  F (36.9  C) (Tympanic)   Resp 16   Ht 1.67 m (5' 5.75\")   Wt 96.2 kg (212 lb)   LMP  (LMP Unknown)   SpO2 98%   BMI 34.48 kg/m    Body mass index is 34.48 kg/m .  Physical Exam   GENERAL: alert and no distress  ABDOMEN: hernia like lump on the upper incision just to the left of midline on the lower abdomen  MS: Hands have normal range of motion, there is some loss of strength with the thumbs; patient is able to move and bend her hips and back normally but has pain in the hips with some tenderness in them and the lower back  NEURO: Normal strength and tone, mentation intact and speech normal  PSYCH: mentation appears normal and tearful        Signed Electronically by: Peyton Cook NP    "

## 2025-04-03 NOTE — PATIENT INSTRUCTIONS
Please call [phone] to schedule your ultrasound.  I will notify you with results.  If herniated, I will wait for you to contact me for general surgery referral to fix this if you would like.  If no hernia, would move forward to CT of abdomen to make sure that there is no other issue.  Increase your Wellbutrin to 300 mg daily.  Let me know how things are going over the next month.    The program is called [facility].  I have a website for you to start a health assessment with a  for the weight loss program I have discussed with you.  Once you fill this out, she will reach out to you to see if this is right for you and get you started.  Type in [email or web address] and this will get you to the health assessment.    Let me know if you have any questions,    Peyton Cook, NIKO-BC

## 2025-04-07 ENCOUNTER — HOSPITAL ENCOUNTER (OUTPATIENT)
Dept: ULTRASOUND IMAGING | Facility: CLINIC | Age: 60
Discharge: HOME | End: 2025-04-07
Attending: NURSE PRACTITIONER | Admitting: NURSE PRACTITIONER
Payer: COMMERCIAL

## 2025-04-07 DIAGNOSIS — K43.2 INCISIONAL HERNIA, WITHOUT OBSTRUCTION OR GANGRENE: ICD-10-CM

## 2025-04-07 PROCEDURE — 76705 ECHO EXAM OF ABDOMEN: CPT

## 2025-04-13 ENCOUNTER — HOSPITAL ENCOUNTER (OUTPATIENT)
Dept: MRI IMAGING | Facility: CLINIC | Age: 60
Discharge: HOME | End: 2025-04-13
Attending: NURSE PRACTITIONER | Admitting: NURSE PRACTITIONER
Payer: COMMERCIAL

## 2025-04-13 DIAGNOSIS — M25.551 BILATERAL HIP PAIN: ICD-10-CM

## 2025-04-13 DIAGNOSIS — M25.552 BILATERAL HIP PAIN: ICD-10-CM

## 2025-04-13 DIAGNOSIS — M54.50 BILATERAL LOW BACK PAIN WITHOUT SCIATICA, UNSPECIFIED CHRONICITY: ICD-10-CM

## 2025-04-13 PROCEDURE — 72148 MRI LUMBAR SPINE W/O DYE: CPT

## 2025-04-14 DIAGNOSIS — M51.369 BULGING LUMBAR DISC: Primary | ICD-10-CM

## 2025-04-14 DIAGNOSIS — M25.551 BILATERAL HIP PAIN: ICD-10-CM

## 2025-04-14 DIAGNOSIS — M54.50 BILATERAL LOW BACK PAIN WITHOUT SCIATICA, UNSPECIFIED CHRONICITY: ICD-10-CM

## 2025-04-14 DIAGNOSIS — M25.552 BILATERAL HIP PAIN: ICD-10-CM

## 2025-04-14 RX ORDER — PREDNISONE 20 MG/1
40 TABLET ORAL DAILY
Qty: 10 TABLET | Refills: 0 | Status: SHIPPED | OUTPATIENT
Start: 2025-04-14 | End: 2025-04-19

## 2025-04-15 ENCOUNTER — PATIENT OUTREACH (OUTPATIENT)
Dept: CARE COORDINATION | Facility: CLINIC | Age: 60
End: 2025-04-15

## 2025-04-16 ENCOUNTER — LAB (OUTPATIENT)
Dept: LAB | Facility: CLINIC | Age: 60
End: 2025-04-16
Payer: COMMERCIAL

## 2025-04-16 ENCOUNTER — TELEPHONE (OUTPATIENT)
Dept: FAMILY MEDICINE | Facility: CLINIC | Age: 60
End: 2025-04-16

## 2025-04-16 DIAGNOSIS — B35.1 TOENAIL FUNGUS: ICD-10-CM

## 2025-04-16 LAB
ALBUMIN SERPL BCG-MCNC: 4.6 G/DL (ref 3.5–5.2)
ALP SERPL-CCNC: 72 U/L (ref 40–150)
ALT SERPL W P-5'-P-CCNC: 34 U/L (ref 0–50)
AST SERPL W P-5'-P-CCNC: 30 U/L (ref 0–45)
BILIRUB DIRECT SERPL-MCNC: 0.09 MG/DL (ref 0–0.3)
BILIRUB SERPL-MCNC: 0.3 MG/DL
PROT SERPL-MCNC: 7.2 G/DL (ref 6.4–8.3)

## 2025-04-16 PROCEDURE — 80076 HEPATIC FUNCTION PANEL: CPT

## 2025-04-16 PROCEDURE — 36415 COLL VENOUS BLD VENIPUNCTURE: CPT

## 2025-04-16 NOTE — TELEPHONE ENCOUNTER
Patient dropped off [facility] MRI appeal request for MRI on 4/13/25. Denial paperwork and [place]'s denial attached to form and both routed to Peyton Cook. I have started a letter.

## 2025-04-16 NOTE — LETTER
April 16, 2025      Leslie Turner  [address] [place] [id number]        To Whom It May Concern,     Please reconsider coverage of patient's MRI scan of lower spinal canal without contrast that was completed on April 13, 2025. Patient reports taking ibuprofen 800 mg and Tylenol Arthritis 600 mg as needed for greater than 4 years. She has seen a chiropractor and used heating pad and/or ice pack for ongoing pain. She has been walking on an elliptical machine all without benefit.       Sincerely,        Peyton Cook NP    Electronically signed

## 2025-04-17 NOTE — TELEPHONE ENCOUNTER
Letter printed and signed and put in my outgoing mailbox.  Peyton Cook NP on 4/17/2025 at 7:26 AM

## 2025-04-19 NOTE — TELEPHONE ENCOUNTER
Form faxed to Maggie at [phone]. Copy sent to scan and copy placed in cabinet. Patient notified of status via My Chart.

## 2025-05-09 ENCOUNTER — MYC MEDICAL ADVICE (OUTPATIENT)
Dept: FAMILY MEDICINE | Facility: CLINIC | Age: 60
End: 2025-05-09
Payer: COMMERCIAL

## 2025-05-09 NOTE — TELEPHONE ENCOUNTER
I have put a letter in communications for [facility] to try one more time for coverage.  Please send this to the address provided on this form.  I have it in my outgoing mail box.  Peyton Cook NP on 5/9/2025 at 8:11 AM

## 2025-05-09 NOTE — LETTER
5/9/2025      Leslie Turner  [address] [place] [id number]    To whom it may concern:    Patient has had this back pain for years and now it was moving into the hip and was worsening.  X-ray showed curvature and some disc loss and with increase in pain, I ordered the MRI.  MRI showed that she has edema and disc bulge that was likely causing pain and this prompted referral.  I would ask that you consider the fact that this was completed to find this problem which prompted further referral.    Sincerely,        Peyton Cook NP    Electronically signed

## 2025-05-13 NOTE — TELEPHONE ENCOUNTER
Letter printed and faxed to [facility] appeals [phone]    Cary Medina on 5/13/2025 at 12:27 PM

## 2025-05-28 DIAGNOSIS — F32.9 REACTIVE DEPRESSION: ICD-10-CM

## 2025-05-28 DIAGNOSIS — F41.1 GAD (GENERALIZED ANXIETY DISORDER): ICD-10-CM

## 2025-05-29 ENCOUNTER — OFFICE VISIT (OUTPATIENT)
Dept: DERMATOLOGY | Facility: CLINIC | Age: 60
End: 2025-05-29
Payer: COMMERCIAL

## 2025-05-29 DIAGNOSIS — L82.1 SEBORRHEIC KERATOSIS: ICD-10-CM

## 2025-05-29 DIAGNOSIS — B35.1 ONYCHOMYCOSIS: Primary | ICD-10-CM

## 2025-05-29 DIAGNOSIS — D22.9 MULTIPLE BENIGN NEVI: ICD-10-CM

## 2025-05-29 DIAGNOSIS — L60.3 DYSTROPHIC NAIL: ICD-10-CM

## 2025-05-29 DIAGNOSIS — L71.9 ACNE ROSACEA: ICD-10-CM

## 2025-05-29 DIAGNOSIS — D18.01 CHERRY ANGIOMA: ICD-10-CM

## 2025-05-29 DIAGNOSIS — L81.2 EPHELIDES: ICD-10-CM

## 2025-05-29 DIAGNOSIS — L81.4 LENTIGO: ICD-10-CM

## 2025-05-29 RX ORDER — FLUCONAZOLE 200 MG/1
200 TABLET ORAL WEEKLY
Qty: 12 TABLET | Refills: 1 | Status: SHIPPED | OUTPATIENT
Start: 2025-05-29

## 2025-05-29 RX ORDER — BUPROPION HYDROCHLORIDE 150 MG/1
150 TABLET ORAL EVERY MORNING
Qty: 90 TABLET | Refills: 2 | Status: SHIPPED | OUTPATIENT
Start: 2025-05-29

## 2025-05-29 RX ORDER — AZELAIC ACID 0.15 G/G
GEL TOPICAL
Qty: 50 G | Refills: 3 | Status: SHIPPED | OUTPATIENT
Start: 2025-05-29

## 2025-05-29 NOTE — LETTER
5/29/2025      Leslie Turner  [address] [facility] [place] [id number]      Dear Colleague,    Thank you for referring your patient, Leslie Turner, to the [facility]. Please see a copy of my visit note below.    Leslie Turner is a pleasant 60 year old year old female patient here today for skin check. No painful or bleeding skin lesions no new or changing nevi.  She notes dystrophic nails for years. She has tried oral Lamisil without improvements. Patient has no other skin complaints today.  Remainder of the HPI, Meds, PMH, Allergies, FH, and SH was reviewed in chart.    Pertinent Hx:   No personal history of skin cancer. Family history of skin cancer.  Past Medical History:   Diagnosis Date     Arthritis      Backache, unspecified      Bilateral club feet      Depressive disorder      Depressive disorder, not elsewhere classified      Dysmenorrhea      Esophageal reflux      Irritable bowel syndrome        Past Surgical History:   Procedure Laterality Date     COLONOSCOPY  2018     HERNIA REPAIR  1967     HYSTERECTOMY, PAP NO LONGER INDICATED       SOFT TISSUE SURGERY  1/2022    Rotator cuff     SURGICAL HISTORY OF -       HEMORRHOIDECTOMY     SURGICAL HISTORY OF -       ANAL FISTULA, SPHINCTEROTOMY     SURGICAL HISTORY OF -   2005    CARPAL TUNNEL RELEASE     SURGICAL HISTORY OF -       HERNIA     SURGICAL HISTORY OF -   1995    BTL        Family History   Problem Relation Age of Onset     Diabetes Mother      Arthritis Mother      Kidney Disease Mother      Skin Cancer Mother      Diabetes Brother      Cancer Maternal Grandmother      Alzheimer Disease Maternal Grandmother        Social History     Socioeconomic History     Marital status:      Spouse name: Not on file     Number of children: Not on file     Years of education: Not on file     Highest education level: Not on file   Occupational History     Not on file   Tobacco Use     Smoking status: Former     Current  packs/day: 0.00     Types: Cigarettes     Smokeless tobacco: Never     Tobacco comments:     Patient quit in 1995,  smokes in car and outside.  stop smoking about 8-9 yrs ago   Vaping Use     Vaping status: Never Used   Substance and Sexual Activity     Alcohol use: Not Currently     Drug use: No     Sexual activity: Yes     Partners: Male     Birth control/protection: Female Surgical   Other Topics Concern     Parent/sibling w/ CABG, MI or angioplasty before 65F 55M? No   Social History Narrative     Not on file     Social Drivers of Health     Financial Resource Strain: Low Risk  (10/19/2023)    Financial Resource Strain      Within the past 12 months, have you or your family members you live with been unable to get utilities (heat, electricity) when it was really needed?: No   Food Insecurity: Low Risk  (10/19/2023)    Food Insecurity      Within the past 12 months, did you worry that your food would run out before you got money to buy more?: No      Within the past 12 months, did the food you bought just not last and you didn t have money to get more?: No   Transportation Needs: Low Risk  (10/19/2023)    Transportation Needs      Within the past 12 months, has lack of transportation kept you from medical appointments, getting your medicines, non-medical meetings or appointments, work, or from getting things that you need?: No   Physical Activity: Not on file   Stress: Not on file   Social Connections: Not on file   Interpersonal Safety: Low Risk  (12/3/2024)    Interpersonal Safety      Do you feel physically and emotionally safe where you currently live?: Yes      Within the past 12 months, have you been hit, slapped, kicked or otherwise physically hurt by someone?: No      Within the past 12 months, have you been humiliated or emotionally abused in other ways by your partner or ex-partner?: No   Housing Stability: Low Risk  (10/19/2023)    Housing Stability      Do you have housing? : Yes       Are you worried about losing your housing?: No       Outpatient Encounter Medications as of 5/29/2025   Medication Sig Dispense Refill     acetaminophen (TYLENOL) 500 MG tablet Take 500-1,000 mg by mouth every 6 hours as needed for mild pain       albuterol (PROAIR HFA/PROVENTIL HFA/VENTOLIN HFA) 108 (90 Base) MCG/ACT inhaler USE 2 INHALATIONS BY MOUTH INTO  THE LUNGS EVERY 6 HOURS AS  NEEDED FOR SHORTNESS OF BREATH,  WHEEZING OR COUGH 34 g 1     buPROPion (WELLBUTRIN XL) 300 MG 24 hr tablet Take 1 tablet (300 mg) by mouth every morning. 90 tablet 3     diclofenac (VOLTAREN) 1 % topical gel Apply 4 g topically 4 times daily 350 g 1     estradiol (ESTRACE) 0.1 MG/GM vaginal cream Place 2 g vaginally twice a week (Patient taking differently: Place 2 g vaginally twice a week. Taking PRN) 42.5 g 1     fluconazole (DIFLUCAN) 200 MG tablet Take 1 tablet (200 mg) by mouth once a week. 12 tablet 1     ibuprofen (ADVIL/MOTRIN) 800 MG tablet Take 800 mg by mouth every 8 hours as needed for moderate pain       Lubricants (ASTROGLIDE) GEL Externally apply topically daily as needed 66.5 g 3     triamcinolone (KENALOG) 0.1 % external cream Apply topically 2 times daily For up to two weeks (Patient not taking: Reported on 5/29/2025) 45 g 1     [DISCONTINUED] buPROPion (WELLBUTRIN XL) 150 MG 24 hr tablet Take 1 tablet (150 mg) by mouth every morning. 90 tablet 1     No facility-administered encounter medications on file as of 5/29/2025.             O:   NAD, WDWN, Alert & Oriented, Mood & Affect wnl, Vitals stable   Here today alone   LMP  (LMP Unknown)    General appearance normal   Vitals stable   Alert, oriented and in no acute distress      Dystrophic nails with subungual debris on toenails   Stuck on papules and brown macules on trunk and ext   Red papules on trunk  Few inflammatory papules on face   Brown papules and macules with regular pigment network and borders on torso and extremities      The remainder of skin exam is  normal       Eyes: Conjunctivae/lids:Normal     ENT: Lips: normal    MSK:Normal    Pulm: Breathing Normal    Neuro/Psych: Orientation:Alert and Orientedx3 ; Mood/Affect:normal   A/P:  1. Dystrophic nail   Favor onychomycosis, will obtain nail culture to confirm.   Hepatic panel normal in April.   Start fluconazole 200 mg weekly x 6 month.   Recheck liver test in 2-3 months.   2. Acne rosacea  Start metrocream in am.   Start azelaic acid in pm   3. Seborrheic keratosis, lentigo, angioma, benign nevi, ephelides  It was a pleasure speaking to Leslie Turner today.BENIGN LESIONS DISCUSSED WITH PATIENT:  I discussed the specifics of tumor, prognosis, and genetics of benign lesions.  I explained that treatment of these lesions would be purely cosmetic and not medically neccessary.  I discussed with patient different removal options including excision, cautery and /or laser.      Nature and genetics of benign skin lesions dicussed with patient.  Signs and Symptoms of skin cancer discussed with patient.  ABCDEs of melanoma reviewed with patient.  Patient encouraged to perform monthly skin exams.  UV precautions reviewed with patient.  Risks of non-melanoma skin cancer discussed with patient   Return to clinic in one year or sooner if needed.       Again, thank you for allowing me to participate in the care of your patient.        Sincerely,        Dana Melgar PA-C    Electronically signed

## 2025-05-30 NOTE — PROGRESS NOTES
Leslie Turner is a pleasant 60 year old year old female patient here today for skin check. No painful or bleeding skin lesions no new or changing nevi.  She notes dystrophic nails for years. She has tried oral Lamisil without improvements. Patient has no other skin complaints today.  Remainder of the HPI, Meds, PMH, Allergies, FH, and SH was reviewed in chart.    Pertinent Hx:   No personal history of skin cancer. Family history of skin cancer.  Past Medical History:   Diagnosis Date    Arthritis     Backache, unspecified     Bilateral club feet     Depressive disorder     Depressive disorder, not elsewhere classified     Dysmenorrhea     Esophageal reflux     Irritable bowel syndrome        Past Surgical History:   Procedure Laterality Date    COLONOSCOPY  2018    HERNIA REPAIR  1967    HYSTERECTOMY, PAP NO LONGER INDICATED      SOFT TISSUE SURGERY  1/2022    Rotator cuff    SURGICAL HISTORY OF -       HEMORRHOIDECTOMY    SURGICAL HISTORY OF -       ANAL FISTULA, SPHINCTEROTOMY    SURGICAL HISTORY OF -   2005    CARPAL TUNNEL RELEASE    SURGICAL HISTORY OF -       HERNIA    SURGICAL HISTORY OF -   1995    BTL        Family History   Problem Relation Age of Onset    Diabetes Mother     Arthritis Mother     Kidney Disease Mother     Skin Cancer Mother     Diabetes Brother     Cancer Maternal Grandmother     Alzheimer Disease Maternal Grandmother        Social History     Socioeconomic History    Marital status:      Spouse name: Not on file    Number of children: Not on file    Years of education: Not on file    Highest education level: Not on file   Occupational History    Not on file   Tobacco Use    Smoking status: Former     Current packs/day: 0.00     Types: Cigarettes    Smokeless tobacco: Never    Tobacco comments:     Patient quit in 1995,  smokes in car and outside.  stop smoking about 8-9 yrs ago   Vaping Use    Vaping status: Never Used   Substance and Sexual Activity    Alcohol  use: Not Currently    Drug use: No    Sexual activity: Yes     Partners: Male     Birth control/protection: Female Surgical   Other Topics Concern    Parent/sibling w/ CABG, MI or angioplasty before 65F 55M? No   Social History Narrative    Not on file     Social Drivers of Health     Financial Resource Strain: Low Risk  (10/19/2023)    Financial Resource Strain     Within the past 12 months, have you or your family members you live with been unable to get utilities (heat, electricity) when it was really needed?: No   Food Insecurity: Low Risk  (10/19/2023)    Food Insecurity     Within the past 12 months, did you worry that your food would run out before you got money to buy more?: No     Within the past 12 months, did the food you bought just not last and you didn t have money to get more?: No   Transportation Needs: Low Risk  (10/19/2023)    Transportation Needs     Within the past 12 months, has lack of transportation kept you from medical appointments, getting your medicines, non-medical meetings or appointments, work, or from getting things that you need?: No   Physical Activity: Not on file   Stress: Not on file   Social Connections: Not on file   Interpersonal Safety: Low Risk  (12/3/2024)    Interpersonal Safety     Do you feel physically and emotionally safe where you currently live?: Yes     Within the past 12 months, have you been hit, slapped, kicked or otherwise physically hurt by someone?: No     Within the past 12 months, have you been humiliated or emotionally abused in other ways by your partner or ex-partner?: No   Housing Stability: Low Risk  (10/19/2023)    Housing Stability     Do you have housing? : Yes     Are you worried about losing your housing?: No       Outpatient Encounter Medications as of 5/29/2025   Medication Sig Dispense Refill    acetaminophen (TYLENOL) 500 MG tablet Take 500-1,000 mg by mouth every 6 hours as needed for mild pain      albuterol (PROAIR HFA/PROVENTIL HFA/VENTOLIN  HFA) 108 (90 Base) MCG/ACT inhaler USE 2 INHALATIONS BY MOUTH INTO  THE LUNGS EVERY 6 HOURS AS  NEEDED FOR SHORTNESS OF BREATH,  WHEEZING OR COUGH 34 g 1    buPROPion (WELLBUTRIN XL) 300 MG 24 hr tablet Take 1 tablet (300 mg) by mouth every morning. 90 tablet 3    diclofenac (VOLTAREN) 1 % topical gel Apply 4 g topically 4 times daily 350 g 1    estradiol (ESTRACE) 0.1 MG/GM vaginal cream Place 2 g vaginally twice a week (Patient taking differently: Place 2 g vaginally twice a week. Taking PRN) 42.5 g 1    fluconazole (DIFLUCAN) 200 MG tablet Take 1 tablet (200 mg) by mouth once a week. 12 tablet 1    ibuprofen (ADVIL/MOTRIN) 800 MG tablet Take 800 mg by mouth every 8 hours as needed for moderate pain      Lubricants (ASTROGLIDE) GEL Externally apply topically daily as needed 66.5 g 3    triamcinolone (KENALOG) 0.1 % external cream Apply topically 2 times daily For up to two weeks (Patient not taking: Reported on 5/29/2025) 45 g 1    [DISCONTINUED] buPROPion (WELLBUTRIN XL) 150 MG 24 hr tablet Take 1 tablet (150 mg) by mouth every morning. 90 tablet 1     No facility-administered encounter medications on file as of 5/29/2025.             O:   NAD, WDWN, Alert & Oriented, Mood & Affect wnl, Vitals stable   Here today alone   LMP  (LMP Unknown)    General appearance normal   Vitals stable   Alert, oriented and in no acute distress      Dystrophic nails with subungual debris on toenails   Stuck on papules and brown macules on trunk and ext   Red papules on trunk  Few inflammatory papules on face   Brown papules and macules with regular pigment network and borders on torso and extremities      The remainder of skin exam is normal       Eyes: Conjunctivae/lids:Normal     ENT: Lips: normal    MSK:Normal    Pulm: Breathing Normal    Neuro/Psych: Orientation:Alert and Orientedx3 ; Mood/Affect:normal   A/P:  1. Dystrophic nail   Favor onychomycosis, will obtain nail culture to confirm.   Hepatic panel normal in April.    Start fluconazole 200 mg weekly x 6 month.   Recheck liver test in 2-3 months.   2. Acne rosacea  Start metrocream in am.   Start azelaic acid in pm   3. Seborrheic keratosis, lentigo, angioma, benign nevi, ephelides  It was a pleasure speaking to Leslie Turner today.BENIGN LESIONS DISCUSSED WITH PATIENT:  I discussed the specifics of tumor, prognosis, and genetics of benign lesions.  I explained that treatment of these lesions would be purely cosmetic and not medically neccessary.  I discussed with patient different removal options including excision, cautery and /or laser.      Nature and genetics of benign skin lesions dicussed with patient.  Signs and Symptoms of skin cancer discussed with patient.  ABCDEs of melanoma reviewed with patient.  Patient encouraged to perform monthly skin exams.  UV precautions reviewed with patient.  Risks of non-melanoma skin cancer discussed with patient   Return to clinic in one year or sooner if needed.

## 2025-06-02 ENCOUNTER — OFFICE VISIT (OUTPATIENT)
Dept: PALLIATIVE MEDICINE | Facility: CLINIC | Age: 60
End: 2025-06-02
Attending: NURSE PRACTITIONER
Payer: COMMERCIAL

## 2025-06-02 ENCOUNTER — MYC MEDICAL ADVICE (OUTPATIENT)
Dept: FAMILY MEDICINE | Facility: CLINIC | Age: 60
End: 2025-06-02

## 2025-06-02 VITALS — SYSTOLIC BLOOD PRESSURE: 134 MMHG | DIASTOLIC BLOOD PRESSURE: 70 MMHG | HEART RATE: 54 BPM

## 2025-06-02 DIAGNOSIS — M25.552 BILATERAL HIP PAIN: ICD-10-CM

## 2025-06-02 DIAGNOSIS — M79.18 MYOFASCIAL MUSCLE PAIN: ICD-10-CM

## 2025-06-02 DIAGNOSIS — M54.50 BILATERAL LOW BACK PAIN WITHOUT SCIATICA, UNSPECIFIED CHRONICITY: ICD-10-CM

## 2025-06-02 DIAGNOSIS — M47.816 SPONDYLOSIS OF LUMBAR REGION WITHOUT MYELOPATHY OR RADICULOPATHY: Primary | ICD-10-CM

## 2025-06-02 DIAGNOSIS — M51.369 BULGING LUMBAR DISC: ICD-10-CM

## 2025-06-02 DIAGNOSIS — M25.551 BILATERAL HIP PAIN: ICD-10-CM

## 2025-06-02 PROCEDURE — 3078F DIAST BP <80 MM HG: CPT | Performed by: PAIN MEDICINE

## 2025-06-02 PROCEDURE — 3075F SYST BP GE 130 - 139MM HG: CPT | Performed by: PAIN MEDICINE

## 2025-06-02 PROCEDURE — 1125F AMNT PAIN NOTED PAIN PRSNT: CPT | Performed by: PAIN MEDICINE

## 2025-06-02 PROCEDURE — 99204 OFFICE O/P NEW MOD 45 MIN: CPT | Performed by: PAIN MEDICINE

## 2025-06-02 RX ORDER — TIZANIDINE 2 MG/1
2 TABLET ORAL 2 TIMES DAILY PRN
Qty: 60 TABLET | Refills: 1 | Status: SHIPPED | OUTPATIENT
Start: 2025-06-02

## 2025-06-02 ASSESSMENT — PAIN SCALES - GENERAL: PAINLEVEL_OUTOF10: MILD PAIN (3)

## 2025-06-02 NOTE — PATIENT INSTRUCTIONS
- Further procedures recommended:    - would recommend bilateral Lumbar MEDIAL BRANCH BLOCK   L3,4,5  - Medication Management:    - start zanaflex 2mg twice a day as needed   - Physical Therapy: Order placed   - Clinical Health Psychologist to address issues of relaxation, behavioral change, coping style, and other factors important to improvement: no  - Diagnostic Studies: reviewed MRI with patient   - Urine toxicology screen today: no   - Follow up: 2-3 months or sooner for procedure    -reasonable to try chiropractic care       ----------------------------------------------------------------  Clinic Number:  [phone]   Call with any questions about your care and for scheduling assistance.   Calls are returned Monday through Friday between 8 AM and 4:30 PM. We usually get back to you within 2 business days depending on the issue/request.    If we are prescribing your medications:  For opioid medication refills, call the clinic or send a [employer] message 7 days in advance.  Please include:  Name of requested medication  Name of the pharmacy.  For non-opioid medications, call your pharmacy directly to request a refill. Please allow 3-4 days to be processed.   Per [place] State Law:  All controlled substance prescriptions must be filled within 30 days of being written.    For those controlled substances allowing refills, pickup must occur within 30 days of last fill.      We believe regular attendance is key to your success in our program!    Any time you are unable to keep your appointment we ask that you call us at least 24 hours in advance to cancel.This will allow us to offer the appointment time to another patient.   Multiple missed appointments may lead to dismissal from the clinic.

## 2025-06-02 NOTE — PROGRESS NOTES
[facility]    Date of visit: 6/2/2025    Primary Care Provider: Dr. Peyton Cook    Reason for consultation:    Leslie Turner is a 60 year old female who is seen in consultation today at the request of her primary care physician, Peyton Cook.     Chief Complaint:    Back pain     Pain history:  Leslie Turner is a 60 year old female, with a PMH significant for CTS and IBS, with low back pain.   - She has midline and bilateral low back pain. She also feels pain extend to bilateral lateral hips with increased activity.   - Pain started about 25 years old when she developed significant pain at work and chiropractor was able to realign her back   - Denies radicular pain down legs   - Describes the back pain as miserable and tender   - Pain is worse with activity and laying down in bed can tighten the back. Walking slow increases low back pain.    - Pain improves with heat   - No numbness or tingling   - Denies weakness     Denies red flags including: bowel or bladder symptoms, fever, chills, saddle anesthesia, profound motor loss, history of cancer, history of immune compromise, weight loss. Reports urgent incontinence.     Impact of Pain: There is significant limitation.     Current medication treatments include:  Ibuprofen prn -Some improvement   Tylenol -Some improvement   Lidocaine patches -Helpful    Green lipped mussel supplement -Helpful     Other treatments have included:  Leslie Turner has not been seen at a pain clinic in the past.      PT: Last course of PT 5 years ago   Interventional: No  Acupuncture/chiro: Chiropractor was helpful in the past   TENs Unit: No  Injections: no spine injection      Past Medical History:  Past Medical History:   Diagnosis Date    Arthritis     Backache, unspecified     Bilateral club feet     Depressive disorder     Depressive disorder, not elsewhere classified     Dysmenorrhea     Esophageal reflux     Irritable bowel  syndrome      Past Surgical History:  Past Surgical History:   Procedure Laterality Date    COLONOSCOPY  2018    HERNIA REPAIR  1967    HYSTERECTOMY, PAP NO LONGER INDICATED      SOFT TISSUE SURGERY  1/2022    Rotator cuff    SURGICAL HISTORY OF -       HEMORRHOIDECTOMY    SURGICAL HISTORY OF -       ANAL FISTULA, SPHINCTEROTOMY    SURGICAL HISTORY OF -   2005    CARPAL TUNNEL RELEASE    SURGICAL HISTORY OF -       HERNIA    SURGICAL HISTORY OF -   1995    BTL     Medications:  Current Outpatient Medications   Medication Sig Dispense Refill    acetaminophen (TYLENOL) 500 MG tablet Take 500-1,000 mg by mouth every 6 hours as needed for mild pain      albuterol (PROAIR HFA/PROVENTIL HFA/VENTOLIN HFA) 108 (90 Base) MCG/ACT inhaler USE 2 INHALATIONS BY MOUTH INTO  THE LUNGS EVERY 6 HOURS AS  NEEDED FOR SHORTNESS OF BREATH,  WHEEZING OR COUGH 34 g 1    azelaic acid (FINACIA) 15 % external gel Apply daily to face in the evening. 50 g 3    buPROPion (WELLBUTRIN XL) 150 MG 24 hr tablet TAKE 1 TABLET BY MOUTH EVERY  MORNING 90 tablet 2    buPROPion (WELLBUTRIN XL) 300 MG 24 hr tablet Take 1 tablet (300 mg) by mouth every morning. 90 tablet 3    diclofenac (VOLTAREN) 1 % topical gel Apply 4 g topically 4 times daily 350 g 1    estradiol (ESTRACE) 0.1 MG/GM vaginal cream Place 2 g vaginally twice a week (Patient taking differently: Place 2 g vaginally twice a week. Taking PRN) 42.5 g 1    fluconazole (DIFLUCAN) 200 MG tablet Take 1 tablet (200 mg) by mouth once a week. 12 tablet 1    ibuprofen (ADVIL/MOTRIN) 800 MG tablet Take 800 mg by mouth every 8 hours as needed for moderate pain      Lubricants (ASTROGLIDE) GEL Externally apply topically daily as needed 66.5 g 3    metroNIDAZOLE (METROCREAM) 0.75 % external cream Apply daily to face in the morning. 45 g 3    triamcinolone (KENALOG) 0.1 % external cream Apply topically 2 times daily For up to two weeks (Patient not taking: Reported on 6/2/2025) 45 g 1      Allergies:  Allergies   Allergen Reactions    Morphine And Codeine Nausea and Vomiting     Dizziness, and fever       Social History:  Home situation: Lives with  and mother (Patient is a primary care for her Mother)   Tobacco use: No  Alcohol use: No  Drug use: No    Chemical dependency: The patient denies any history of treatment for alcohol or drug abuse.    Family history:  Family History   Problem Relation Age of Onset    Diabetes Mother     Arthritis Mother     Kidney Disease Mother     Skin Cancer Mother     Diabetes Brother     Cancer Maternal Grandmother     Alzheimer Disease Maternal Grandmother          Diagnostic Tests:  MRI Lumbar completed on 4/13/25 showed:  IMPRESSION:  1.  Multilevel lumbar spondylosis without high-grade spinal canal or neural foraminal stenosis. Abutment of descending right L2 nerve root in the lateral recess by disc bulge.  2.  Articular and particular edema associated with the right L2-3 and left L5-S1 facet joints, suggestive of active degenerative arthropathy.  3.  Modic type I changes at the opposing L2-3 endplates.  4.  Left convex scoliosis with the apex at L3.    Pelvis and hip x-ray (4/3/25) IMPRESSION: Normal hip joint spaces and alignment. No acute fracture. Lower lumbar facet arthropathy.     Lumbar x-ray (4/3/25) IMPRESSION: Mild levocurvature apex to the left at L2. Kruger angle of approximately 20 degrees measured from L1 to L3. Sacrum and sacroiliac joints are unremarkable. Vertebral body heights are maintained. Multilevel loss of disc height. Posterior element   degenerative changes.      Review of Systems:    POSTIVE IN BOLD  GENERAL: fever/chills, fatigue, general unwell feeling, weight gain/loss.  HEAD/EYES:  headache, dizziness, or vision changes.    EARS/NOSE/THROAT:  Nosebleeds, hearing loss, sinus infection, earache, tinnitus.  IMMUNE:  Allergies, cancer, immune deficiency, or infections.  SKIN:  Urticaria, rash, hives  HEME/Lymphatic:   anemia,  easy bruising, easy bleeding.  RESPIRATORY:  cough, wheezing, or shortness of breath  CARDIOVASCULAR/Circulation:  Extremity edema, syncope, hypertension, tachycardia, or angina.  GASTROINTESTINAL:  abdominal pain, nausea/emesis, diarrhea, constipation,  hematochezia, or melena.  ENDOCRINE:  Diabetes, steroid use,  thyroid disease or osteoporosis.  MUSCULOSKELETAL: neck pain, back pain, arthralgia, arthritis, or gout.  GENITOURINARY:  frequency, urgency, dysuria, difficulty voiding, hematuria or incontinence.  NEUROLOGIC:  weakness, numbness, paresthesias, seizure, tremor, stroke or memory loss.  PSYCHIATRIC:  depression, anxiety, stress, suicidal thoughts or mood swings.     Physical Exam:  Vitals:    06/02/25 1307   BP: 134/70   Pulse: 54     Exam:  Constitutional: healthy, alert, and no distress  Head: normocephalic. Atraumatic.   Eyes: no redness or jaundice noted   ENT: oropharnx normal.  MMM.  Neck supple.    Cardiovascular: neg jvd  Respiratory: speaking in full sentences. No accessory muscles   Gastrointestinal: non-distended   Skin: no suspicious lesions or rashes  Psychiatric: mentation appears normal and affect normal/bright    Musculoskeletal exam:  Lumbar spine:  Lumbar ROM within normal limits -lumbar extension elicits pain   Myofascial tenderness:  Bilateral lower lumbar paraspinal muscles.   Straight leg exam: negative bilaterally   Sacroiliac (SI) joint tenderness: mild bilaterally   Greater trochanteric tenderness: positive bilaterally   ELVIS/FADIR: Causes lateral hip pain     Neurologic exam:  Motor:  5/5 LE strength bilaterally     Reflexes:    -negative clonus bilaterally     Sensory:  (lower extremities):   Light touch: normal    Allodynia: absent    Hyperalgesia: absent         Assessment:  Leslie Turner is a 60 year old female with a past medical history significant for lbp presenting with bilateral LBP. Pain is most consistent with bilateral lower lumbar facet mediated pain. There is  also possibly bilateral greater trochanter pain syndrome.         Plan:  Diagnosis reviewed, treatment options addressed, and risks/benefits discussed. The patient was involved in shared decision making regarding the plan as laid out below.    - Further procedures recommended:    - would recommend bilateral Lumbar MEDIAL BRANCH BLOCK   L3,4,5  - Medication Management:    - start zanaflex 2mg twice a day as needed   - Physical Therapy: Order placed   - Clinical Health Psychologist to address issues of relaxation, behavioral change, coping style, and other factors important to improvement: no  - Diagnostic Studies: reviewed MRI with patient   - Urine toxicology screen today: no   - Follow up: 2-3 months or sooner for procedure    -reasonable to try chiropractic care     Blank Garcia DO  Pain medicine Fellow    I saw and examined the patient with the Pain Fellow/Resident. I have reviewed and agree with the resident's note and plan of care and made changes and corrections directly to the body of the note.   TIME SPENT:   BY FELLOW/RESIDENT ALONE 30 MIN   BY MYSELF AND FELLOW/RESIDENT TOGETHER 0 MIN   BY MYSELF WITHOUT THE FELLOW/RESIDENT 25 MIN     The total TIME spent on this patient on the day of the appointment was 30 minutes.   Time spent preparing to see the patient (reviewing records and tests)  Time spend face to face with the patient  Time spent ordering tests, medications, procedures and referrals  Time spent Referring and communicating with other healthcare professionals  Documenting clinical information in Epic  Mukesh Higgins MD.  Medical Spine Specialist  [facility]

## 2025-06-05 ENCOUNTER — THERAPY VISIT (OUTPATIENT)
Dept: PHYSICAL THERAPY | Facility: CLINIC | Age: 60
End: 2025-06-05
Attending: PAIN MEDICINE
Payer: COMMERCIAL

## 2025-06-05 DIAGNOSIS — M47.816 SPONDYLOSIS OF LUMBAR REGION WITHOUT MYELOPATHY OR RADICULOPATHY: ICD-10-CM

## 2025-06-05 LAB — BACTERIA SPEC CULT: NORMAL

## 2025-06-05 PROCEDURE — 97140 MANUAL THERAPY 1/> REGIONS: CPT | Mod: GP | Performed by: PHYSICAL THERAPIST

## 2025-06-05 PROCEDURE — 97110 THERAPEUTIC EXERCISES: CPT | Mod: GP | Performed by: PHYSICAL THERAPIST

## 2025-06-05 PROCEDURE — 97162 PT EVAL MOD COMPLEX 30 MIN: CPT | Mod: GP | Performed by: PHYSICAL THERAPIST

## 2025-06-05 NOTE — PROGRESS NOTES
PHYSICAL THERAPY EVALUATION  Type of Visit: Evaluation       Fall Risk Screen:  Have you fallen 2 or more times in the past year?: No  Have you fallen and had an injury in the past year?: No  Is patient receiving Physical Therapy Services?: No    Subjective         Presenting condition or subjective complaint: Let back and hip pain  Date of onset:      Relevant medical history: Arthritis; Bladder or bowel problems; Depression; Menopause; Overweight   Dates & types of surgery:      Prior diagnostic imaging/testing results: CT scan; X-ray     Prior therapy history for the same diagnosis, illness or injury: No      Prior Level of Function  Transfers: Independent  Ambulation: Independent  ADL: Independent  IADL:     Living Environment  Social support: With a significant other or spouse   Type of home: House   Stairs to enter the home: No       Ramp: No   Stairs inside the home: Yes 155 Is there a railing: Yes     Help at home: None  Equipment owned:       Employment: No    Hobbies/Interests:    Past Medical History:   Diagnosis Date    Arthritis     Backache, unspecified     Bilateral club feet     Depressive disorder     Depressive disorder, not elsewhere classified     Dysmenorrhea     Esophageal reflux     Irritable bowel syndrome      Past Surgical History:   Procedure Laterality Date    COLONOSCOPY  2018    HERNIA REPAIR  1967    HYSTERECTOMY, PAP NO LONGER INDICATED      SOFT TISSUE SURGERY  1/2022    Rotator cuff    SURGICAL HISTORY OF -       HEMORRHOIDECTOMY    SURGICAL HISTORY OF -       ANAL FISTULA, SPHINCTEROTOMY    SURGICAL HISTORY OF -   2005    CARPAL TUNNEL RELEASE    SURGICAL HISTORY OF -       HERNIA    SURGICAL HISTORY OF -   1995    BTL     Patient Active Problem List   Diagnosis    Esophageal reflux    CTS (carpal tunnel syndrome)    History of right knee surgery    Irritable bowel syndrome with constipation   Long hx of LBP since 2005.  Has been dealing with it for a long time.  Neck and shoulders  are always tight and sore.  Walking is painful.      Patient goals for therapy: Get up without grimising from pain and stiffness    Pain assessment: Location: L>R low back/Ratin.5   In evening.  Will radiate into the left leg.    Objective   LUMBAR SPINE EVALUATION  PAIN:   INTEGUMENTARY (edema, incisions):   POSTURE:  rounded, forward  GAIT:   Weightbearing Status:   Assistive Device(s):   Gait Deviations:   BALANCE/PROPRIOCEPTION:   WEIGHTBEARING ALIGNMENT:   NON-WEIGHTBEARING ALIGNMENT:    ROM: FB mid shin, ext ERP, SB L worse than R.    PELVIC/SI SCREEN: + sup to sit  STRENGTH:     MYOTOMES:   DTR S: normal quad and   CORD SIGNS:   DERMATOMES:   NEURAL TENSION:   FLEXIBILITY:   LUMBAR/HIP Special Tests:  +spring, + sup to sit, +FB L post positionand function, - SLR, -reverse SLR  PELVIS/SI SPECIAL TESTS:   FUNCTIONAL TESTS:   PALPATION:   SPINAL SEGMENTAL CONCLUSIONS:    FRS LL5 RL4, L post innom    Assessment & Plan   CLINICAL IMPRESSIONS  Medical Diagnosis:      Treatment Diagnosis:     Impression/Assessment: Patient is a 60 year old female with chronic LB complaints.  The following significant findings have been identified: Pain, Decreased ROM/flexibility, Decreased joint mobility, Decreased strength, Impaired balance, Decreased proprioception, Impaired gait, Impaired muscle performance, Decreased activity tolerance, and Impaired posture. These impairments interfere with their ability to perform self care tasks, recreational activities, household chores, driving , household mobility, and community mobility as compared to previous level of function.     Clinical Decision Making (Complexity):  Clinical Presentation: Evolving/Changing  Clinical Presentation Rationale: based on medical and personal factors listed in PT evaluation  Clinical Decision Making (Complexity): Moderate complexity    PLAN OF CARE  Treatment Interventions:  Interventions: Manual Therapy, Neuromuscular Re-education, Therapeutic  Activity, Therapeutic Exercise, Self-Care/Home Management    Long Term Goals     PT Goal 1  Goal Identifier: 1  Goal Description: pt will be able to  Rationale: to maximize safety and independence with performance of ADLs and functional tasks  PT Goal 2  Goal Identifier: 2  Goal Description: pt will be able to  Rationale: to maximize safety and independence with performance of ADLs and functional tasks  PT Goal 3  Goal Identifier: 3  Goal Description: pt will be able to  Rationale: to maximize safety and independence with performance of ADLs and functional tasks      Frequency of Treatment: 1x/wk  Duration of Treatment: 8wk    Recommended Referrals to Other Professionals:   Education Assessment:   Learner/Method: Patient    Risks and benefits of evaluation/treatment have been explained.   Patient/Family/caregiver agrees with Plan of Care.     Evaluation Time:             Signing Clinician: Jono Douglas, PT

## 2025-06-12 LAB — BACTERIA SPEC CULT: NORMAL

## 2025-06-19 LAB — BACTERIA SPEC CULT: NORMAL

## 2025-06-26 LAB — BACTERIA SPEC CULT: NO GROWTH

## 2025-07-03 ENCOUNTER — THERAPY VISIT (OUTPATIENT)
Dept: PHYSICAL THERAPY | Facility: CLINIC | Age: 60
End: 2025-07-03
Attending: PAIN MEDICINE
Payer: COMMERCIAL

## 2025-07-03 DIAGNOSIS — F41.1 GAD (GENERALIZED ANXIETY DISORDER): ICD-10-CM

## 2025-07-03 DIAGNOSIS — M47.816 SPONDYLOSIS OF LUMBAR REGION WITHOUT MYELOPATHY OR RADICULOPATHY: Primary | ICD-10-CM

## 2025-07-03 PROCEDURE — 97110 THERAPEUTIC EXERCISES: CPT | Mod: GP | Performed by: PHYSICAL THERAPIST

## 2025-07-03 PROCEDURE — 97140 MANUAL THERAPY 1/> REGIONS: CPT | Mod: GP | Performed by: PHYSICAL THERAPIST

## 2025-07-03 RX ORDER — BUPROPION HYDROCHLORIDE 300 MG/1
TABLET ORAL
Qty: 90 TABLET | Refills: 0 | OUTPATIENT
Start: 2025-07-03

## 2025-07-06 ENCOUNTER — HEALTH MAINTENANCE LETTER (OUTPATIENT)
Age: 60
End: 2025-07-06

## 2025-07-17 ENCOUNTER — THERAPY VISIT (OUTPATIENT)
Dept: PHYSICAL THERAPY | Facility: CLINIC | Age: 60
End: 2025-07-17
Attending: PAIN MEDICINE
Payer: COMMERCIAL

## 2025-07-17 DIAGNOSIS — M47.816 SPONDYLOSIS OF LUMBAR REGION WITHOUT MYELOPATHY OR RADICULOPATHY: Primary | ICD-10-CM

## 2025-07-17 PROCEDURE — 97140 MANUAL THERAPY 1/> REGIONS: CPT | Mod: GP | Performed by: PHYSICAL THERAPIST

## 2025-07-17 PROCEDURE — 97110 THERAPEUTIC EXERCISES: CPT | Mod: GP | Performed by: PHYSICAL THERAPIST

## 2025-08-06 ENCOUNTER — LAB (OUTPATIENT)
Dept: LAB | Facility: CLINIC | Age: 60
End: 2025-08-06
Payer: COMMERCIAL

## 2025-08-06 DIAGNOSIS — B35.1 ONYCHOMYCOSIS: ICD-10-CM

## 2025-08-06 LAB
ALBUMIN SERPL BCG-MCNC: 4.3 G/DL (ref 3.5–5.2)
ALP SERPL-CCNC: 78 U/L (ref 40–150)
ALT SERPL W P-5'-P-CCNC: 32 U/L (ref 0–50)
AST SERPL W P-5'-P-CCNC: 27 U/L (ref 0–45)
BILIRUB DIRECT SERPL-MCNC: 0.18 MG/DL (ref 0–0.3)
BILIRUB SERPL-MCNC: 0.7 MG/DL
PROT SERPL-MCNC: 6.9 G/DL (ref 6.4–8.3)

## 2025-08-06 PROCEDURE — 80076 HEPATIC FUNCTION PANEL: CPT

## 2025-08-06 PROCEDURE — 36415 COLL VENOUS BLD VENIPUNCTURE: CPT
